# Patient Record
Sex: FEMALE | Race: WHITE | NOT HISPANIC OR LATINO | Employment: OTHER | URBAN - METROPOLITAN AREA
[De-identification: names, ages, dates, MRNs, and addresses within clinical notes are randomized per-mention and may not be internally consistent; named-entity substitution may affect disease eponyms.]

---

## 2018-02-27 ENCOUNTER — HOSPITAL ENCOUNTER (OUTPATIENT)
Dept: NON INVASIVE DIAGNOSTICS | Facility: HOSPITAL | Age: 58
Discharge: HOME | End: 2018-02-27
Payer: COMMERCIAL

## 2018-02-27 DIAGNOSIS — M79.609 PAIN IN LIMB: ICD-10-CM

## 2018-02-27 DIAGNOSIS — M25.50 PAIN IN JOINT, MULTIPLE SITES: ICD-10-CM

## 2018-02-27 PROCEDURE — 93971 EXTREMITY STUDY: CPT | Performed by: SURGERY

## 2018-02-27 PROCEDURE — 93971 EXTREMITY STUDY: CPT

## 2018-07-24 ENCOUNTER — TELEPHONE (OUTPATIENT)
Dept: OBGYN CLINIC | Facility: HOSPITAL | Age: 58
End: 2018-07-24

## 2018-07-24 NOTE — TELEPHONE ENCOUNTER
Patient had records sent over for Dr Tania Angelucci to review  They were sent by mail  Were they received? If so, what is the review status?  Please advise  Call back number: Justin Dia, tequila

## 2018-07-26 NOTE — TELEPHONE ENCOUNTER
Mr Robert Alyssa I left a message on your phone to call me regarding the office notes you are inquiring about  We have not received them  When you call back we can look further into this matter  Have a good day   Carlos Yeboah MA

## 2018-08-07 ENCOUNTER — OFFICE VISIT (OUTPATIENT)
Dept: OBGYN CLINIC | Facility: CLINIC | Age: 58
End: 2018-08-07
Payer: COMMERCIAL

## 2018-08-07 ENCOUNTER — APPOINTMENT (OUTPATIENT)
Dept: RADIOLOGY | Facility: CLINIC | Age: 58
End: 2018-08-07
Payer: COMMERCIAL

## 2018-08-07 VITALS
HEIGHT: 61 IN | SYSTOLIC BLOOD PRESSURE: 111 MMHG | WEIGHT: 111.6 LBS | BODY MASS INDEX: 21.07 KG/M2 | HEART RATE: 71 BPM | DIASTOLIC BLOOD PRESSURE: 72 MMHG

## 2018-08-07 DIAGNOSIS — M25.552 PAIN IN LEFT HIP: ICD-10-CM

## 2018-08-07 DIAGNOSIS — M70.62 TROCHANTERIC BURSITIS OF LEFT HIP: Primary | ICD-10-CM

## 2018-08-07 DIAGNOSIS — Z96.649 STATUS POST REVISION OF TOTAL HIP REPLACEMENT: ICD-10-CM

## 2018-08-07 PROCEDURE — 20610 DRAIN/INJ JOINT/BURSA W/O US: CPT | Performed by: ORTHOPAEDIC SURGERY

## 2018-08-07 PROCEDURE — 99203 OFFICE O/P NEW LOW 30 MIN: CPT | Performed by: ORTHOPAEDIC SURGERY

## 2018-08-07 PROCEDURE — 73503 X-RAY EXAM HIP UNI 4/> VIEWS: CPT

## 2018-08-07 RX ORDER — BUPIVACAINE HYDROCHLORIDE 2.5 MG/ML
1 INJECTION, SOLUTION EPIDURAL; INFILTRATION; INTRACAUDAL
Status: COMPLETED | OUTPATIENT
Start: 2018-08-07 | End: 2018-08-07

## 2018-08-07 RX ORDER — BETAMETHASONE SODIUM PHOSPHATE AND BETAMETHASONE ACETATE 3; 3 MG/ML; MG/ML
6 INJECTION, SUSPENSION INTRA-ARTICULAR; INTRALESIONAL; INTRAMUSCULAR; SOFT TISSUE
Status: COMPLETED | OUTPATIENT
Start: 2018-08-07 | End: 2018-08-07

## 2018-08-07 RX ADMIN — BETAMETHASONE SODIUM PHOSPHATE AND BETAMETHASONE ACETATE 6 MG: 3; 3 INJECTION, SUSPENSION INTRA-ARTICULAR; INTRALESIONAL; INTRAMUSCULAR; SOFT TISSUE at 09:32

## 2018-08-07 RX ADMIN — BUPIVACAINE HYDROCHLORIDE 1 ML: 2.5 INJECTION, SOLUTION EPIDURAL; INFILTRATION; INTRACAUDAL at 09:32

## 2018-08-07 NOTE — PROGRESS NOTES
Assessment:     1  Trochanteric bursitis of left hip    2  Status post revision of total hip replacement          Plan:     Problem List Items Addressed This Visit        Musculoskeletal and Integument    Trochanteric bursitis of left hip - Primary     59-year-old female with left hip and thigh pain following a revision left total hip arthroplasty  I discussed with her that the majority of her problems seem to be stemming from a very tight ITB band in addition to some underlying muscle weakness  I offered her an injection today  Please refer to the procedure note  She was given a script for physical therapy  I discussed that due to the severity of her soft tissue sensitivity and tightness this will likely take quite a while the settle down  I would be happy to repeat an injection for her in three months  Relevant Orders    XR hip/pelv 4+ vw left if performed    Large joint arthrocentesis    Ambulatory referral to Physical Therapy       Other    Status post revision of total hip replacement     59-year-old female status post revision left total hip arthroplasty for a femoral stem subsidence following failure of a femoral nail  Based on radiographs her hip looks to be in good position there is no gross loosening or failure of the implants  I reviewed the radiographs with the patient  Follow-up for the hip as needed  Relevant Orders    Ambulatory referral to Physical Therapy           Patient ID: Gerardo Mauro is a 59 y o  female  Chief Complaint:  Left hip pain after multiple surgeries    HPI:  59-year-old female with pain in her left hip  In January of 2016 she had a fall sustaining a hip fracture  It appears that she had reduction and internal fixation of the left hip  When she was in rehab she had a fall  She went on 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 with a failed cephalomedullary nail  In August of 2017 she had a total hip replacement   This is uncomplicated by subsidence of the femoral stem and she had a revision left total hip arthroplasty performed in January of 2018  She has had continued pain since her hip replacement surgeries  She points to the lateral aspect of her hip and states that it is around the distal part of the incision where it hurts her the most   She has pain if she is bending down or she sitting for any length of time  She is unable to lift her leg up on the phone without severe pain  She has to use her arms to lift her leg up partly because of pain and partly because of weakness  She does get some numbness and tingling in her left foot at times of foot goes very cold  She has had no history of any infections  She had physical therapy back in March  She uses a cane for the left leg  Initially she was not needing that, but now that her pain is so severe she needs to use the cane  She is wanting to know what else can be done for her hip  Patient's medical intake was reviewed  Allergy:  No Known Allergies    Medications:  all current active meds have been reviewed    Past Medical History:  Past Medical History:   Diagnosis Date    COPD (chronic obstructive pulmonary disease) ([facility] [address] )     Disease of thyroid gland     GERD (gastroesophageal reflux disease)     Glaucoma     Psychiatric disorder     bipolar    Restless leg syndrome        Past Surgical History:  Past Surgical History:   Procedure Laterality Date    APPENDECTOMY      FISTULA REPAIR      HIP SURGERY      HYSTERECTOMY      JOINT REPLACEMENT         Family History:  History reviewed  No pertinent family history  Social History:  History   Alcohol Use No     History   Drug Use No     History   Smoking Status    Former Smoker   Smokeless Tobacco    Never Used           ROS:  Review of Systems   Constitutional: Negative  HENT: Negative  Eyes: Negative  Respiratory: Negative  Gastrointestinal: Negative  Endocrine: Negative  Genitourinary: Negative      Musculoskeletal: Positive for arthralgias and joint swelling  Skin: Negative  Allergic/Immunologic: Negative  Neurological: Negative  Hematological: Negative  Psychiatric/Behavioral: Negative  Objective:  BP Readings from Last 1 Encounters:   08/07/18 111/72      Wt Readings from Last 1 Encounters:   08/07/18 50 6 kg (111 lb 9 6 oz)        BMI:   Estimated body mass index is 21 09 kg/m² as calculated from the following:    Height as of this encounter: 5' 1" (1 549 m)  Weight as of this encounter: 50 6 kg (111 lb 9 6 oz)  EXAM:   Physical Exam   Constitutional: She is oriented to person, place, and time  She appears well-developed and well-nourished  No distress  HENT:   Head: Normocephalic and atraumatic  Eyes: Right eye exhibits no discharge  Left eye exhibits no discharge  Neck: Normal range of motion  Neck supple  No tracheal deviation present  Cardiovascular: Normal rate and regular rhythm  Pulmonary/Chest: Effort normal and breath sounds normal  No respiratory distress  She exhibits no tenderness  Abdominal: Soft  She exhibits no distension  There is no tenderness  Neurological: She is alert and oriented to person, place, and time  Skin: Skin is warm and dry  She is not diaphoretic  No erythema  Psychiatric: She has a normal mood and affect  Her behavior is normal    Vitals reviewed  Right Hip Exam   Right hip exam is normal      Tenderness   The patient is experiencing no tenderness  Range of Motion   The patient has normal right hip ROM  Muscle Strength   The patient has normal right hip strength  Tests   TACO: negative    Other   Erythema: absent  Sensation: normal  Pulse: present      Left Hip Exam     Comments:  Patient's left hip has a well-healed posterior lateral incision the much smaller incision just anterior to this over the proximal aspect of the greater troch    She is very tender over the greater trochanter area and all the way down along her ITB band to just above her knee  She has no groin pain with internal and external rotation  She is very guarded gait and is unable to swing her left leg all the way through  She leads with her right foot and brings her left foot to her right  Flexion and extension of the toes and the ankles intact  She has a palpable dorsal pedis pulse  Sensation light touch is intact in the superficial and deep peroneal, sural and saphenous nerve distribution  She has no swelling of the thigh  No erythema or fluctuance  She has noted quadriceps weakness  Radiographs:  I have personally reviewed pertinent films in PACS and my interpretation is X-rays show a left total hip arthroplasty  The acetabulum is in good position with no evidence of loosening  The femoral stem has a collar on it and is cemented  There is no evidence of subsidence or failure  No fractures or dislocations       Large joint arthrocentesis  Date/Time: 8/7/2018 9:32 AM  Consent given by: patient  Timeout: Immediately prior to procedure a time out was called to verify the correct patient, procedure, equipment, support staff and site/side marked as required   Supporting Documentation  Indications: pain   Procedure Details  Location: hip - L greater trochanteric bursa  Preparation: Patient was prepped and draped in the usual sterile fashion  Needle size: 22 G  Approach: posterolateral  Medications administered: 6 mg betamethasone acetate-betamethasone sodium phosphate 6 (3-3) mg/mL; 1 mL bupivacaine (PF) 0 25 %    Patient tolerance: patient tolerated the procedure well with no immediate complications  Dressing:  Sterile dressing applied

## 2018-08-07 NOTE — ASSESSMENT & PLAN NOTE
59-year-old female with left hip and thigh pain following a revision left total hip arthroplasty  I discussed with her that the majority of her problems seem to be stemming from a very tight ITB band in addition to some underlying muscle weakness  I offered her an injection today  Please refer to the procedure note  She was given a script for physical therapy  I discussed that due to the severity of her soft tissue sensitivity and tightness this will likely take quite a while the settle down  I would be happy to repeat an injection for her in three months

## 2018-08-07 NOTE — ASSESSMENT & PLAN NOTE
20-year-old female status post revision left total hip arthroplasty for a femoral stem subsidence following failure of a femoral nail  Based on radiographs her hip looks to be in good position there is no gross loosening or failure of the implants  I reviewed the radiographs with the patient  Follow-up for the hip as needed

## 2018-08-08 ENCOUNTER — EVALUATION (OUTPATIENT)
Dept: PHYSICAL THERAPY | Facility: CLINIC | Age: 58
End: 2018-08-08
Payer: COMMERCIAL

## 2018-08-08 DIAGNOSIS — M70.62 TROCHANTERIC BURSITIS OF LEFT HIP: ICD-10-CM

## 2018-08-08 DIAGNOSIS — Z96.649 STATUS POST REVISION OF TOTAL HIP REPLACEMENT: Primary | ICD-10-CM

## 2018-08-08 PROCEDURE — 97110 THERAPEUTIC EXERCISES: CPT | Performed by: PHYSICAL THERAPIST

## 2018-08-08 PROCEDURE — 97162 PT EVAL MOD COMPLEX 30 MIN: CPT | Performed by: PHYSICAL THERAPIST

## 2018-08-08 PROCEDURE — G8978 MOBILITY CURRENT STATUS: HCPCS | Performed by: PHYSICAL THERAPIST

## 2018-08-08 PROCEDURE — G8979 MOBILITY GOAL STATUS: HCPCS | Performed by: PHYSICAL THERAPIST

## 2018-08-08 NOTE — PROGRESS NOTES
PT Evaluation     Today's date: 2018  Patient name: Brody Irwin  : 1960  MRN: [id number]  Referring provider: Marcy Faustin MD  Dx:   Encounter Diagnosis     ICD-10-CM    1  Status post revision of total hip replacement Z96 649 Ambulatory referral to Physical Therapy   2  Trochanteric bursitis of left hip M70 62 Ambulatory referral to Physical Therapy                  Assessment  Impairments: abnormal gait, abnormal muscle firing, abnormal muscle tone, abnormal or restricted ROM, impaired balance, impaired physical strength and pain with function    Assessment details: Pt is a 59y o  year old female coming to outpatient PT s/p L THR revision and L trochanter bursitis  Pt presents with decreased LLE ROM; decreased LLE strength; increased pain; gait and balance dysfunction; and overall decreased functional mobility  Pt would benefit from skilled PT services in order to address these deficits and reach maximum level of function  Thank you kindly for the referral!     Prognosis: good    Goals  ST  Pt will decrease pain levels by 3 points on NPS in 4 weeks  2  Pt will demonstrate improvement in LLE strength by 1/2 MMT grade in 4 weeks  3  Pt will be independent with HEP in 4 weeks  LT  Achieve FOTO score of 47/100 in 6 weeks  2  Pt will be able to ambulate with LRAD in 6 weeks      Plan  Patient would benefit from: skilled PT  Planned modality interventions: cryotherapy  Planned therapy interventions: manual therapy, joint mobilization, neuromuscular re-education, patient education, postural training, therapeutic exercise, therapeutic activities, gait training, functional ROM exercises, balance/weight bearing training, balance and activity modification  Frequency: 2x week  Duration in weeks: 6        Subjective Evaluation    History of Present Illness  Mechanism of injury: Pt reports her L hip started 16 she fell and broke it   She reports that the next day she had pins put in, she was doing really well, they put her in nursing home in [place] and she reports the nurses partied at night - had to get up to go to BR and fell - damaged everything that was in there  Pt reports they took her to ER, gave her meds for pain, took her back to nursing home, stayed there for a month; went to surgeon - said everything was ok but she was still in a lot of pain  Pt had L THR 2017  Pt reports she was doing well for a while, but her leg started turning and couldn't turn the direction she wanted to  She was referred to back specialist - which said nothing was wrong with back  Pt reports back MD blamed surgeon for not gluing the part of his surgery and the hip MD then performed that 2018, even though she felt terribly against it  She reports the then went through PT and then all of sudden she had more pain  The surgeon wanted to send her ot leg specialist but pt reports she was done with him because she didn't trust him  She reports that she was released from him and went to Dr Walter Byers  Pt was pleased with treatment from Dr Walter Byers  She then referred her to PT here  Pt reports at points she will use her W/C at times because she couldn't get around  Trouble with lifting and moving LLE  Pt reports she stumbles over it while walking because she feels it is dragging  Using SPC to ambulate at this time       Script from MD states posterior hip precautions; pt does report original surgoen did clear her of precautions    Regarding depression screening - pt declined to answer last question of PHQ-9 - discussed with patient possible resources we have, pt reports she is seeing someone at [facility] and is getting current treatment for her depression - just has been worsened by this situation  Pain  At best pain ratin  At worst pain rating: 10  Relieving factors: rest    Social Support  Steps to enter house: yes  2  Lives in: [place]  Lives with: spouse      Diagnostic Tests  Abnormal x-ray: waiting on results  Treatments  Previous treatment: physical therapy  Patient Goals  Patient goals for therapy: decreased pain and increased strength  Patient goal: walk without a cane        Objective     Observations     Additional Observation Details  Posture: L iliac crest higher in standing; L toe out noted in stance  Gait assessment: use of SPC in R; decreased stance time of LLE; antalgic gait; L toe out  Palpation: increased tenderness to palpation over L ITB/TFL  Observation: very guarded; apprehensive to movement; incision intact; healed    Active Range of Motion     Right Hip   Flexion: [facility]    Additional Active Range of Motion Details  *not assessed due to increased pain and apprehension from patient    Passive Range of Motion   Left Hip   External rotation (90/90): 10 degrees with pain  Internal rotation (90/90): 10 degrees with pain    Strength/Myotome Testing     Left Hip   Planes of Motion   Flexion: 3-  Extension: 3-  Left hip abductors strength: seated  Adduction: 2+  External rotation: 2  Internal rotation: 2    Right Hip   Normal muscle strength    Left Knee   Flexion: 3  Extension: 3    Right Knee   Flexion: 4-  Extension: 4-    Tests     Left Hip   SLR: Positive  Right Hip   SLR: Positive  Flowsheet Rows      Most Recent Value   PT/OT G-Codes   Current Score  38   Projected Score  47   FOTO information reviewed  N/A   Assessment Type  Evaluation   G code set  Mobility: Walking & Moving Around   Mobility: Walking and Moving Around Current Status ()  CL   Mobility: Walking and Moving Around Goal Status ()  CK          Daily Treatment Diary     DX: s/p L revision for THR (1/2018);  L hip bursitis  EPOC: 9/19/18  Precautions: posterior hip precautions*; visual deficits  CO-MORBIDITES: h/o stroke  PERSONAL FACTORS: depression    Manual  8/8            L hip PROM (precautions)             L ITB stretch - post hip prec (?)             L DTM - incision - desentization Exercise Diary              Gastroc Stretch             HR/TR             Glute sets             [employer]             LAQ             Standing Marches             HS curl             Standing Hip Abduction             Step Up/lateral             Mini squats             Iso Hip ABD seated HEP            Iso Hip ADD seated HEP            LAQ seated HEP            HS set seated HEP                                                                                 Modalities              CP

## 2018-08-15 ENCOUNTER — OFFICE VISIT (OUTPATIENT)
Dept: PHYSICAL THERAPY | Facility: CLINIC | Age: 58
End: 2018-08-15
Payer: COMMERCIAL

## 2018-08-15 DIAGNOSIS — M70.62 TROCHANTERIC BURSITIS OF LEFT HIP: ICD-10-CM

## 2018-08-15 DIAGNOSIS — Z96.649 STATUS POST REVISION OF TOTAL HIP REPLACEMENT: Primary | ICD-10-CM

## 2018-08-15 PROCEDURE — 97140 MANUAL THERAPY 1/> REGIONS: CPT

## 2018-08-15 PROCEDURE — 97112 NEUROMUSCULAR REEDUCATION: CPT

## 2018-08-15 PROCEDURE — 97110 THERAPEUTIC EXERCISES: CPT

## 2018-08-15 NOTE — PROGRESS NOTES
Daily Note     Today's date: 8/15/2018  Patient name: Dheeraj Garcia  : 1960  MRN: [id number]  Referring provider: Fahad Knight MD  Dx:   Encounter Diagnosis     ICD-10-CM    1  Status post revision of total hip replacement Z96 649    2  Trochanteric bursitis of left hip M70 62                   Subjective: Pt  Stated no change since last seen, a minor increase in discomfort in L glute today  Objective: See treatment diary below      Assessment: Tolerated treatment well  Patient demonstrated fatigue post treatment, exhibited good technique with therapeutic exercises and would benefit from continued PT  Initiated PT POC today  She tolerated all initial TE's well with only minor discomfort  She presented with a SPC but does not use often-not at all while in the house  Without cane she has significant antalgic gait  Pt  Began to fatigue by end of session  Plan: Continue per plan of care  Progress treatment as tolerated  Daily Treatment Diary     DX: s/p L revision for THR (2018);  L hip bursitis  EPOC: 18  Precautions: posterior hip precautions*; visual deficits  CO-MORBIDITES: h/o stroke  PERSONAL FACTORS: depression    Manual  8/8 8/15           L hip PROM (precautions)  10           L ITB stretch - post hip prec (?)             L DTM - incision - desentization                                           Exercise Diary   8/15           Gastroc Stretch  15"x3           HR/TR  20           Glute sets             Delfina Hackett             LAQ  5"x10           Standing Marches  15           HS curl  15           Standing Hip Abduction  15           Step Up/lateral  4"  x10 ea           Mini squats             Iso Hip ABD seated HEP 5"x10           Iso Hip ADD seated HEP 5"x10           LAQ seated HEP 5"x10           HS set seated HEP            Supine Hip abd out 1 in 4  otb  10                                                                   Modalities              CP

## 2018-08-17 ENCOUNTER — OFFICE VISIT (OUTPATIENT)
Dept: PHYSICAL THERAPY | Facility: CLINIC | Age: 58
End: 2018-08-17
Payer: COMMERCIAL

## 2018-08-17 DIAGNOSIS — Z96.649 STATUS POST REVISION OF TOTAL HIP REPLACEMENT: Primary | ICD-10-CM

## 2018-08-17 DIAGNOSIS — M70.62 TROCHANTERIC BURSITIS OF LEFT HIP: ICD-10-CM

## 2018-08-17 PROCEDURE — 97110 THERAPEUTIC EXERCISES: CPT

## 2018-08-17 PROCEDURE — 97140 MANUAL THERAPY 1/> REGIONS: CPT

## 2018-08-17 PROCEDURE — 97112 NEUROMUSCULAR REEDUCATION: CPT

## 2018-08-17 NOTE — PROGRESS NOTES
Daily Note     Today's date: 2018  Patient name: Mj Sarmiento  : 1960  MRN: [id number]  Referring provider: Milton Ram MD  Dx:   Encounter Diagnosis     ICD-10-CM    1  Status post revision of total hip replacement Z96 649    2  Trochanteric bursitis of left hip M70 62                   Subjective: Pt  Stated she felt good after last visit, she just continues to fatigue quickly      Objective: See treatment diary below      Assessment: Tolerated treatment well  Patient demonstrated fatigue post treatment, exhibited good technique with therapeutic exercises and would benefit from continued PT  Pt  Continues to tolerate progressions well  She is very fatigued by end of session but motivated to continue to progress  Will continue to strengthen  Educated pt  On how to desensitize scar at home    Plan: Continue per plan of care  Progress treatment as tolerated  Daily Treatment Diary     DX: s/p L revision for THR (2018);  L hip bursitis  EPOC: 18  Precautions: posterior hip precautions*; visual deficits  CO-MORBIDITES: h/o stroke  PERSONAL FACTORS: depression    Manual  8/8 8/15 8/17          L hip PROM (precautions)  10 10          L ITB stretch - post hip prec (?)             L DTM - incision - desentization                                           Exercise Diary   8/15 8/17          Recumbent Bike   5'          Gastroc Stretch  15"x3 15"x3 ea          HR/TR  20 20          Glute sets             [employer]   5"x10          SAQ             LAQ  5"x10 1#  5"x10          Standing Marches  15 1#  10x2          HS curl  15 1#  10x2          Standing Hip Abduction  15 1#  10x2          Step Up/lateral  downs  4"  x10 ea 6"x10          Mini squats   10          Iso Hip ABD seated HEP 5"x10 5"x10          Iso Hip ADD seated HEP 5"x10 5"x10          LAQ seated HEP 5"x10 1#  5"x10          HS set seated HEP            Supine Hip abd out 1 in 4  otb  10 otb  10          SLR   AA  x10 Modalities  8/17            CP 5

## 2018-08-20 ENCOUNTER — OFFICE VISIT (OUTPATIENT)
Dept: PHYSICAL THERAPY | Facility: CLINIC | Age: 58
End: 2018-08-20
Payer: COMMERCIAL

## 2018-08-20 DIAGNOSIS — M70.62 TROCHANTERIC BURSITIS OF LEFT HIP: ICD-10-CM

## 2018-08-20 DIAGNOSIS — Z96.649 STATUS POST REVISION OF TOTAL HIP REPLACEMENT: Primary | ICD-10-CM

## 2018-08-20 PROCEDURE — 97112 NEUROMUSCULAR REEDUCATION: CPT

## 2018-08-20 PROCEDURE — 97140 MANUAL THERAPY 1/> REGIONS: CPT

## 2018-08-20 PROCEDURE — 97110 THERAPEUTIC EXERCISES: CPT

## 2018-08-20 NOTE — PROGRESS NOTES
Daily Note     Today's date: 2018  Patient name: Gerardo Mauro  : 1960  MRN: [id number]  Referring provider: Ely Delgado MD  Dx:   Encounter Diagnosis     ICD-10-CM    1  Status post revision of total hip replacement Z96 649    2  Trochanteric bursitis of left hip M70 62                   Subjective: Pt  Stated that the outside of her left leg was sore following LV  Notes that she is currently experiencing no pain  Objective: See treatment diary below      Assessment: Tolerated treatment well  Patient demonstrated fatigue post treatment, exhibited good technique with therapeutic exercises and would benefit from continued PT  Pt  Continues to tolerate progressions well  She was bale to complete a SLR without assistance today  She did have difficulty with steps today using her arms to pull herself up  With cuing pt was able to complete step ups with on handrail on the left  Continued with all other TE as charted  Plan: Continue per plan of care  Progress treatment as tolerated  Daily Treatment Diary     DX: s/p L revision for THR (2018);  L hip bursitis  EPOC: 18  Precautions: posterior hip precautions*; visual deficits  CO-MORBIDITES: h/o stroke  PERSONAL FACTORS: depression    Manual  8/8 8/15 8/17 8/20         L hip PROM (precautions)  10 10 10         L ITB stretch - post hip prec (?)             L DTM - incision - desentization                                           Exercise Diary   8/15 8/17 8/20         Recumbent Bike   5' 5'         Gastroc Stretch  15"x3 15"x3 ea 15"x3 ea         HR/TR  20 20 20         Glute sets             Quad sets   5"x10 5"x15         SAQ             LAQ  5"x10 1#  5"x10 1# 5"x 15         Standing Marches  15 1#  10x2 1# 10x2         HS curl  15 1#  10x2 1# 10x2         Standing Hip Abduction  15 1#  10x2 1# 10x2         Step Up/lateral  downs  4"  x10 ea 6"x10 6" x10         Mini squats   10 15         Iso Hip ABD seated HEP 5"x10 5"x10 5"x10 Iso Hip ADD seated HEP 5"x10 5"x10 5"x10         LAQ seated HEP 5"x10 1#  5"x10 1# 5"x 20         HS set seated HEP            Supine Hip abd out 1 in 4  otb  10 otb  10 otb 10         SLR   AA  x10 x10                                                    Modalities  8/17 8/20           CP 5 6

## 2018-08-22 ENCOUNTER — OFFICE VISIT (OUTPATIENT)
Dept: PHYSICAL THERAPY | Facility: CLINIC | Age: 58
End: 2018-08-22
Payer: COMMERCIAL

## 2018-08-22 DIAGNOSIS — M70.62 TROCHANTERIC BURSITIS OF LEFT HIP: ICD-10-CM

## 2018-08-22 DIAGNOSIS — Z96.649 STATUS POST REVISION OF TOTAL HIP REPLACEMENT: Primary | ICD-10-CM

## 2018-08-22 PROCEDURE — 97110 THERAPEUTIC EXERCISES: CPT | Performed by: PHYSICAL THERAPIST

## 2018-08-22 PROCEDURE — 97140 MANUAL THERAPY 1/> REGIONS: CPT | Performed by: PHYSICAL THERAPIST

## 2018-08-22 PROCEDURE — 97112 NEUROMUSCULAR REEDUCATION: CPT | Performed by: PHYSICAL THERAPIST

## 2018-08-22 NOTE — PROGRESS NOTES
Daily Note     Today's date: 2018  Patient name: Rehana Baez  : 1960  MRN: [id number]  Referring provider: Sneha Tena MD  Dx:   Encounter Diagnosis     ICD-10-CM    1  Status post revision of total hip replacement Z96 649    2  Trochanteric bursitis of left hip M70 62                   Subjective: Pt reported feeling sore after previous session, but soreness lasted less than 24 hours  Objective: See treatment diary below      Assessment: Tolerated treatment well  Pt was able to continue progressing standing exercises by increasing repetitions and weights  Pt reported feeling a slight increase in tightness with lateral step ups, but otherwise did not report increase pain with any exercises  Pt reported still having increased soreness and sensitivity around incisions, so focused on scar massage and desensitization  Pt has 90 degrees of L hip flexion PROM, but has increased tightness with ER and abduction  Pt reported feeling muscle soreness after session  Plan: Continue per plan of care  Progress treatment as tolerated  **Co-treated with BC,SPT on 18        Daily Treatment Diary     DX: s/p L revision for THR (2018);  L hip bursitis  EPOC: 18  Precautions: posterior hip precautions*; visual deficits  CO-MORBIDITES: h/o stroke  PERSONAL FACTORS: depression    Manual  8/8 8/15 8/17 8/20 8/22        L hip PROM (precautions)  10 10 10 5        L ITB stretch - post hip prec (?)             L DTM - incision - desentization     5                                      Exercise Diary   8/15 8/17 8/20 8/22        Recumbent Bike   5' 5' 5'        Gastroc Stretch  15"x3 15"x3 ea 15"x3 ea 10"x4         HR/TR  20 20 20 20        Glute sets             [employer]   5"x10 5"x15 np        SAQ             LAQ  5"x10 1#  5"x10 1# 5"x 15 1 5# 3"x10 ea        Standing Marches  15 1#  10x2 1# 10x2 1# 15 ea        HS curl  15 1#  10x2 1# 10x2 1# 15 ea        Standing Hip Abduction  15 1#  10x2 1# 10x2 1# 15 ea        Step Up/lateral  downs  4"  x10 ea 6"x10 6" x10 1# 6" 10x forward, 5x lat        Mini squats   10 15 np        Iso Hip ABD seated HEP 5"x10 5"x10 5"x10 5"x10        Iso Hip ADD seated HEP 5"x10 5"x10 5"x10 5"x10        LAQ seated HEP 5"x10 1#  5"x10 1# 5"x 20         HS set seated HEP            Supine Hip abd out 1 in 4  otb  10 otb  10 otb 10         SLR   AA  x10 x10 10x         Side stepping     2 laps ptb                                      Modalities  8/17 8/20           CP 5 6

## 2018-08-27 ENCOUNTER — OFFICE VISIT (OUTPATIENT)
Dept: PHYSICAL THERAPY | Facility: CLINIC | Age: 58
End: 2018-08-27
Payer: COMMERCIAL

## 2018-08-27 DIAGNOSIS — Z96.649 STATUS POST REVISION OF TOTAL HIP REPLACEMENT: Primary | ICD-10-CM

## 2018-08-27 DIAGNOSIS — M70.62 TROCHANTERIC BURSITIS OF LEFT HIP: ICD-10-CM

## 2018-08-27 PROCEDURE — 97112 NEUROMUSCULAR REEDUCATION: CPT | Performed by: PHYSICAL THERAPIST

## 2018-08-27 PROCEDURE — 97110 THERAPEUTIC EXERCISES: CPT | Performed by: PHYSICAL THERAPIST

## 2018-08-27 PROCEDURE — 97140 MANUAL THERAPY 1/> REGIONS: CPT | Performed by: PHYSICAL THERAPIST

## 2018-08-27 NOTE — PROGRESS NOTES
Daily Note     Today's date: 2018  Patient name: Xiao Dolan  : 1960  MRN: [id number]  Referring provider: Melissa Pérez MD  Dx:   Encounter Diagnosis     ICD-10-CM    1  Status post revision of total hip replacement Z96 649    2  Trochanteric bursitis of left hip M70 62                   Subjective: Pt reported feeling fine after previous session, but sore today due to walking for a long time in the park with her  and dog yesterday  Objective: See treatment diary below      Assessment: Tolerated treatment well  Pt able to continue progressing with TE's without an increase in pain  Continue progressing standing exercises next session  Pt had pain with L abduction PROM, but was able to tolerate L ER PROM without increase in pain within functional limits  Pt able to tolerate increased pressure with deep tissue massage around scar  Pt reported feeling good at the end of session, and just a little bit sore  Plan: Continue per plan of care  Progress treatment as tolerated  **Co-treated with BC,SPT on 18        Daily Treatment Diary     DX: s/p L revision for THR (2018);  L hip bursitis  EPOC: 18  Precautions: posterior hip precautions*; visual deficits  CO-MORBIDITES: h/o stroke  PERSONAL FACTORS: depression    Manual  8/8 8/15 8/17 8/20 8/22 8/27       L hip PROM (precautions)  10 10 10 5 5       L ITB stretch - post hip prec (?)             L DTM - incision - desentization     5 5                                     Exercise Diary   8/15 8/17 8/20 8/22 8/27       Recumbent Bike   5' 5' 5' 5'       Gastroc Stretch  15"x3 15"x3 ea 15"x3 ea 10"x4  3"x20       HR/TR  20 20 20 20 10x ea       Glute sets             [employer]   5"x10 5"x15 np        SAQ             LAQ  5"x10 1#  5"x10 1# 5"x 15 1 5# 3"x10 ea 1 5# 5"x15       Standing Marches  15 1#  10x2 1# 10x2 1# 15 ea 1 5# 10x ea       HS curl  15 1#  10x2 1# 10x2 1# 15 ea 1 5# 10x ea       Standing Hip Abduction  15 1#  10x2 1# 10x2 1# 15 ea 1 5# 10x ea       Step Up/lateral  downs  4"  x10 ea 6"x10 6" x10 1# 6" 10x forward, 5x lat 1 5# 6" 15x fwd/10x lat       Mini squats   10 15 np        Iso Hip ABD seated HEP 5"x10 5"x10 5"x10 5"x10 5"x10       Iso Hip ADD seated HEP 5"x10 5"x10 5"x10 5"x10 5"x10       LAQ seated HEP 5"x10 1#  5"x10 1# 5"x 20         HS set seated HEP            Supine Hip abd out 1 in 4  otb  10 otb  10 otb 10  otb 15x       SLR   AA  x10 x10 10x  15x       Side stepping     2 laps ptb 3 laps otb       TB 4 way SLS                              Modalities  8/17 8/20           CP 5 6

## 2018-08-29 ENCOUNTER — OFFICE VISIT (OUTPATIENT)
Dept: PHYSICAL THERAPY | Facility: CLINIC | Age: 58
End: 2018-08-29
Payer: COMMERCIAL

## 2018-08-29 DIAGNOSIS — M70.62 TROCHANTERIC BURSITIS OF LEFT HIP: ICD-10-CM

## 2018-08-29 DIAGNOSIS — Z96.649 STATUS POST REVISION OF TOTAL HIP REPLACEMENT: Primary | ICD-10-CM

## 2018-08-29 PROCEDURE — 97110 THERAPEUTIC EXERCISES: CPT

## 2018-08-29 PROCEDURE — 97112 NEUROMUSCULAR REEDUCATION: CPT

## 2018-08-29 PROCEDURE — 97140 MANUAL THERAPY 1/> REGIONS: CPT

## 2018-08-29 NOTE — PROGRESS NOTES
Daily Note     Today's date: 2018  Patient name: Gerardo Mauro  : 1960  MRN: [id number]  Referring provider: Ely Delgado MD  Dx:   Encounter Diagnosis     ICD-10-CM    1  Status post revision of total hip replacement Z96 649    2  Trochanteric bursitis of left hip M70 62                   Subjective: Pt reported feeling fine after previous session, but sore today due to walking for a long time in the park with her  and dog yesterday  Objective: See treatment diary below      Assessment: Tolerated treatment well  Pt  continues to show improvement  Pt  tolerated progressions to TE's with minimal difficulty and slight discomfort throughout, increasing with a SLS  Will continue to progress as tolerated and monitor pt  response  Plan: Continue per plan of care  Progress treatment as tolerated  Daily Treatment Diary     DX: s/p L revision for THR (2018);  L hip bursitis  EPOC: 18  Precautions: posterior hip precautions*; visual deficits  CO-MORBIDITES: h/o stroke  PERSONAL FACTORS: depression    Manual  8/8 8/15 8/17 8/20 8/22 8/27 8/29      L hip PROM (precautions)  10 10 10 5 5 8      L ITB stretch - post hip prec (?)             L DTM - incision - desentization     5 5                                     Exercise Diary   8/15 8/17 8/20 8/22 8/27 8/29      Recumbent Bike   5' 5' 5' 5' 5'      Gastroc Stretch  15"x3 15"x3 ea 15"x3 ea 10"x4  3"x20 3x20"      HR/TR  20 20 20 20 10x ea 10x2      Glute sets             [employer]   5"x10 5"x15 np        SAQ             LAQ  5"x10 1#  5"x10 1# 5"x 15 1 5# 3"x10 ea 1 5# 5"x15 2#  5"x10      Standing Marches  15 1#  10x2 1# 10x2 1# 15 ea 1 5# 10x ea 2#  10x2      HS curl  15 1#  10x2 1# 10x2 1# 15 ea 1 5# 10x ea 2#  10x2      Standing Hip Abduction  15 1#  10x2 1# 10x2 1# 15 ea 1 5# 10x ea 2#  10x2      Step Up/lateral  downs  4"  x10 ea 6"x10 6" x10 1# 6" 10x forward, 5x lat 1 5# 6" 15x fwd/10x lat 8" x6  Fwd  6"x10  lat      Mini squats   10 15 np        Iso Hip ABD seated HEP 5"x10 5"x10 5"x10 5"x10 5"x10       Iso Hip ADD seated HEP 5"x10 5"x10 5"x10 5"x10 5"x10 5"x10      LAQ seated HEP 5"x10 1#  5"x10 1# 5"x 20   2#  5"x10      HS set seated HEP            Supine Hip abd out 1 in 4  otb  10 otb  10 otb 10  otb 15x otb  10      Clamshells       otb  10      SLR   AA  x10 x10 10x  15x 15      Side stepping     2 laps ptb 3 laps otb 2 laps  pch TB      TB 4 way SLS       pch  TB  10 ea                       Modalities  8/17 8/20 8/29          CP 5 6 7

## 2018-09-04 ENCOUNTER — OFFICE VISIT (OUTPATIENT)
Dept: PHYSICAL THERAPY | Facility: CLINIC | Age: 58
End: 2018-09-04
Payer: COMMERCIAL

## 2018-09-04 DIAGNOSIS — M70.62 TROCHANTERIC BURSITIS OF LEFT HIP: ICD-10-CM

## 2018-09-04 DIAGNOSIS — Z96.649 STATUS POST REVISION OF TOTAL HIP REPLACEMENT: Primary | ICD-10-CM

## 2018-09-04 PROCEDURE — 97112 NEUROMUSCULAR REEDUCATION: CPT | Performed by: PHYSICAL THERAPIST

## 2018-09-04 PROCEDURE — 97140 MANUAL THERAPY 1/> REGIONS: CPT | Performed by: PHYSICAL THERAPIST

## 2018-09-04 PROCEDURE — 97110 THERAPEUTIC EXERCISES: CPT | Performed by: PHYSICAL THERAPIST

## 2018-09-04 NOTE — PROGRESS NOTES
Daily Note     Today's date: 2018  Patient name: Hetal Arauz  : 1960  MRN: [id number]  Referring provider: Le Tena MD  Dx:   Encounter Diagnosis     ICD-10-CM    1  Status post revision of total hip replacement Z96 649    2  Trochanteric bursitis of left hip M70 62                   Subjective: Pt feeling pretty good  Offers no new complaints  Pt reports a little bit of pain at times when she gets out of her car but it does not linger  Objective: See treatment diary below      Assessment: Tolerated treatment well  Continued to tolerate TE progressions well without complaints of pain and improvement noted in LLE strength  Plan to D/C to HEP Thursday as pt feels she is ready to continue with it on her own  Plan: Continue per plan of care  Progress treatment as tolerated  Daily Treatment Diary     DX: s/p L revision for THR (2018);  L hip bursitis  EPOC: 18  Precautions: posterior hip precautions*; visual deficits  CO-MORBIDITES: h/o stroke  PERSONAL FACTORS: depression    Manual  8/8 8/15 8/17 8/20 8/22 8/27 8/29 9/4     L hip PROM (precautions)  10 10 10 5 5 8 8'     L ITB stretch - post hip prec (?)             L DTM - incision - desentization     5 5                                     Exercise Diary   8/15 8/17 8/20 8/22 8/27 8/29 9/4     Recumbent Bike   5' 5' 5' 5' 5' 5'     Gastroc Stretch  15"x3 15"x3 ea 15"x3 ea 10"x4  3"x20 3x20" 3x20"     HR/TR  20 20 20 20 10x ea 10x2 10x2     Glute sets             [employer]   5"x10 5"x15 np        SAQ             LAQ  5"x10 1#  5"x10 1# 5"x 15 1 5# 3"x10 ea 1 5# 5"x15 2#  5"x10 2#  5"x10     Standing Marches  15 1#  10x2 1# 10x2 1# 15 ea 1 5# 10x ea 2#  10x2 2#  10x2     HS curl  15 1#  10x2 1# 10x2 1# 15 ea 1 5# 10x ea 2#  10x2 2#  10x2     Standing Hip Abduction  15 1#  10x2 1# 10x2 1# 15 ea 1 5# 10x ea 2#  10x2 2#  10x2     Step Up/lateral  downs  4"  x10 ea 6"x10 6" x10 1# 6" 10x forward, 5x lat 1 5# 6" 15x fwd/10x lat 8" x6  Fwd  6"x10  lat 8" x10  Fwd  6"x10  lat     Mini squats   10 15 np        Iso Hip ABD seated HEP 5"x10 5"x10 5"x10 5"x10 5"x10       Iso Hip ADD seated HEP 5"x10 5"x10 5"x10 5"x10 5"x10 5"x10 5"x10     LAQ seated HEP 5"x10 1#  5"x10 1# 5"x 20   2#  5"x10 2#  5"x10     HS set seated HEP            Supine Hip abd out 1 in 4  otb  10 otb  10 otb 10  otb 15x otb  10 otb  10     Clamshells       otb  10 otb  10     SLR   AA  x10 x10 10x  15x 15 15     Side stepping     2 laps ptb 3 laps otb 2 laps  pch TB 2 laps  pch TB     TB 4 way SLS       pch  TB  10 ea pch  TB  10 ea                      Modalities  8/17 8/20 8/29 9/4         CP 5 6 7 5'

## 2018-09-06 ENCOUNTER — EVALUATION (OUTPATIENT)
Dept: PHYSICAL THERAPY | Facility: CLINIC | Age: 58
End: 2018-09-06
Payer: COMMERCIAL

## 2018-09-06 DIAGNOSIS — Z96.649 STATUS POST REVISION OF TOTAL HIP REPLACEMENT: Primary | ICD-10-CM

## 2018-09-06 DIAGNOSIS — M70.62 TROCHANTERIC BURSITIS OF LEFT HIP: ICD-10-CM

## 2018-09-06 PROCEDURE — G8979 MOBILITY GOAL STATUS: HCPCS | Performed by: PHYSICAL THERAPIST

## 2018-09-06 PROCEDURE — G8980 MOBILITY D/C STATUS: HCPCS | Performed by: PHYSICAL THERAPIST

## 2018-09-06 PROCEDURE — 97140 MANUAL THERAPY 1/> REGIONS: CPT | Performed by: PHYSICAL THERAPIST

## 2018-09-06 PROCEDURE — 97110 THERAPEUTIC EXERCISES: CPT | Performed by: PHYSICAL THERAPIST

## 2018-09-06 NOTE — PROGRESS NOTES
PT Re-Evaluation  and PT Discharge    Today's date: 2018  Patient name: Radha Miller  : 1960  MRN: [id number]  Referring provider: Dominic Harrison MD  Dx:   Encounter Diagnosis     ICD-10-CM    1  Status post revision of total hip replacement Z96 649    2  Trochanteric bursitis of left hip M70 62                  Assessment    Assessment details: Pt has been attending outpatient PT for 9 total visits  Pt has made steady progress in PT and has met all impairment and functional goals at this time  Pt is independent with HEP  Pt is D/C from PT to HEP continue to progress towards personal goals  Thank you! Prognosis: good    Goals  ST  Pt will decrease pain levels by 3 points on NPS in 4 weeks  - met  2  Pt will demonstrate improvement in LLE strength by 1/2 MMT grade in 4 weeks  - met  3  Pt will be independent with HEP in 4 weeks  - met  LT  Achieve FOTO score of 47/100 in 6 weeks  - met  2  Pt will be able to ambulate with LRAD in 6 weeks - partially met      Plan  Plan details: D/C from PT at this time        Subjective Evaluation    History of Present Illness  Mechanism of injury: Pt reports that since starting PT she feels better and feels good  Pt reports that she has been able to perform her walking outside at least every day and notices improvement in endurance  Pt reports her pain isn't that bad at all anymore  Pain  At best pain ratin  At worst pain ratin  Relieving factors: rest    Social Support  Steps to enter house: yes  2  L[facility] in: [facility]  Lives with: spouse      Diagnostic Tests  Abnormal x-ray: waiting on results    Treatments  Previous treatment: physical therapy  Patient Goals  Patient goals for therapy: decreased pain and increased strength  Patient goal: walk without a cane - partially met        Objective     Observations     Additional Observation Details  Posture: L iliac crest higher in standing; L toe out noted in stance  Gait assessment: no AD; slight antalgic gait; decreased stance time on LLE; with use of SPC - non-antalgic; improved elvin  Palpation: slight tenderness to palpation over L TFL over incision - but much improved; no more burning sensation    Active Range of Motion     Right Hip   Flexion: [facility]    Additional Active Range of Motion Details  *not assessed due to increased pain and apprehension from patient    Passive Range of Motion   Left Hip   Flexion: 92 degrees   Abduction: 20 degrees   External rotation (90/90): 20 degrees   Internal rotation (90/90): 20 degrees     Strength/Myotome Testing     Left Hip   Planes of Motion   Flexion: 4+  Extension: 4  Abduction: 4- (seated)  Adduction: 4-  External rotation: 4-  Internal rotation: 3+    Right Hip   Normal muscle strength    Left Knee   Flexion: 3  Extension: 3    Right Knee   Flexion: 4-  Extension: 4-    Tests     Left Hip   SLR: Negative  Right Hip   SLR: Negative  Daily Treatment Diary     DX: s/p L revision for THR (1/2018);  L hip bursitis  EPOC: 9/19/18  Precautions: posterior hip precautions*; visual deficits  CO-MORBIDITES: h/o stroke  PERSONAL FACTORS: depression    Manual  8/8 8/15 8/17 8/20 8/22 8/27 8/29 9/4 9/6    L hip PROM (precautions)  10 10 10 5 5 8 8'     L ITB stretch - post hip prec (?)             L DTM - incision - desentization     5 5       Progress report         10'                     Exercise Diary   8/15 8/17 8/20 8/22 8/27 8/29 9/4 9/6    Recumbent Bike   5' 5' 5' 5' 5' 5' 6'    Gastroc Stretch  15"x3 15"x3 ea 15"x3 ea 10"x4  3"x20 3x20" 3x20" 3x20"    HR/TR  20 20 20 20 10x ea 10x2 10x2 10x2    Glute sets             Quad sets   5"x10 5"x15 np        SAQ             LAQ  5"x10 1#  5"x10 1# 5"x 15 1 5# 3"x10 ea 1 5# 5"x15 2#  5"x10 2#  5"x10     Standing Marches  15 1#  10x2 1# 10x2 1# 15 ea 1 5# 10x ea 2#  10x2 2#  10x2 2#  10x2    HS curl  15 1#  10x2 1# 10x2 1# 15 ea 1 5# 10x ea 2#  10x2 2#  10x2 2#  10x2    Standing Hip Abduction  15 1#  10x2 1# 10x2 1# 15 ea 1 5# 10x ea 2#  10x2 2#  10x2 2#  10x2    Step Up/lateral  downs  4"  x10 ea 6"x10 6" x10 1# 6" 10x forward, 5x lat 1 5# 6" 15x fwd/10x lat 8" x6  Fwd  6"x10  lat 8" x10  Fwd  6"x10  lat 8" x10  Fwd  6"x10  lat    Mini squats   10 15 np        Iso Hip ABD seated HEP 5"x10 5"x10 5"x10 5"x10 5"x10       Iso Hip ADD seated HEP 5"x10 5"x10 5"x10 5"x10 5"x10 5"x10 5"x10     LAQ seated HEP 5"x10 1#  5"x10 1# 5"x 20   2#  5"x10 2#  5"x10     HS set seated HEP            Supine Hip abd out 1 in 4  otb  10 otb  10 otb 10  otb 15x otb  10 otb  10     Clamshells       otb  10 otb  10     SLR   AA  x10 x10 10x  15x 15 15     Side stepping     2 laps ptb 3 laps otb 2 laps  pch TB 2 laps  pch TB     TB 4 way SLS       pch  TB  10 ea pch  TB  10 ea                      Modalities  8/17 8/20 8/29 9/4         CP 5 6 7 5'

## 2018-10-23 ENCOUNTER — OFFICE VISIT (OUTPATIENT)
Dept: OBGYN CLINIC | Facility: CLINIC | Age: 58
End: 2018-10-23
Payer: COMMERCIAL

## 2018-10-23 VITALS
SYSTOLIC BLOOD PRESSURE: 126 MMHG | BODY MASS INDEX: 21.07 KG/M2 | HEART RATE: 60 BPM | HEIGHT: 61 IN | WEIGHT: 111.6 LBS | DIASTOLIC BLOOD PRESSURE: 62 MMHG

## 2018-10-23 DIAGNOSIS — M70.62 TROCHANTERIC BURSITIS OF LEFT HIP: Primary | ICD-10-CM

## 2018-10-23 DIAGNOSIS — Z96.649 STATUS POST REVISION OF TOTAL HIP REPLACEMENT: ICD-10-CM

## 2018-10-23 PROCEDURE — 99213 OFFICE O/P EST LOW 20 MIN: CPT | Performed by: ORTHOPAEDIC SURGERY

## 2018-10-23 NOTE — ASSESSMENT & PLAN NOTE
15-year-old female with left hip trochanteric bursitis  I counseled her on deep tissue massage her home exercise program   She will follow up in approximately a month for repeat injection as it is too early to give her another one at this time  I went over some further stretching options with the patient

## 2018-10-23 NOTE — PROGRESS NOTES
Assessment:     1  Trochanteric bursitis of left hip    2  Status post revision of total hip replacement          Plan:     Problem List Items Addressed This Visit        Musculoskeletal and Integument    Trochanteric bursitis of left hip - Primary     49-year-old female with left hip trochanteric bursitis  I counseled her on deep tissue massage her home exercise program   She will follow up in approximately a month for repeat injection as it is too early to give her another one at this time  I went over some further stretching options with the patient  Other    Status post revision of total hip replacement            Patient ID: Taiwo Koroma is a 59 y o  female  Chief Complaint:  Left hip pain     HPI:  49-year-old female who resents to the office today for follow up evaluation of lateral sided left hip pian  She is s/p revision left total hip arthroplasty that was performed in January 2018 by [facility]  At her last appointment on 8/7/18 she underwent a steroid injection for left hip trochanteric bursitis which she states provided her with 80% pain relief  She states she has been compliant with formal physical therapy and has transitioned to a HEP  She states her lateral sided left hip pain is worse when getting up from a seated position  She also noted left leg weakness when going up the stairs  She denies any numbness or tingling           Allergy:  No Known Allergies    Medications:  all current active meds have been reviewed    Past Medical History:  Past Medical History:   Diagnosis Date    COPD [facility]chronic obstructive pulmonary disease) ([facility] [address] )     Depression     Disease of thyroid gland     GERD (gastroesophageal reflux disease)     Glaucoma     Psychiatric disorder     bipolar    Restless leg syndrome     [facility]     3 years; affected eye sight       Past Surgical History:  Past Surgical History:   Procedure Laterality Date    APPENDECTOMY      FISTULA REPAIR      HIP SURGERY      HYSTERECTOMY      JOINT REPLACEMENT         Family History:  History reviewed  No pertinent family history  Social History:  History   Alcohol Use No     History   Drug Use No     History   Smoking Status    Former Smoker   Smokeless Tobacco    Never Used           ROS:  Review of Systems    Objective:  BP Readings from Last 1 Encounters:   10/23/18 126/62      Wt Readings from Last 1 Encounters:   10/23/18 50 6 kg (111 lb 9 6 oz)        BMI:   Estimated body mass index is 21 09 kg/m² as calculated from the following:    Height as of this encounter: 5' 1" (1 549 m)  Weight as of this encounter: 50 6 kg (111 lb 9 6 oz)  EXAM:   Physical Exam  Left Hip Exam     Other   Erythema: absent  Scars: present  Sensation: normal  Pulse: present    Comments:  TTP entire IT band  No pain with external or internal rotation               Radiographs:  I have personally reviewed pertinent films in PACS and my interpretation is ni imaging reviewed today

## 2018-11-27 ENCOUNTER — OFFICE VISIT (OUTPATIENT)
Dept: OBGYN CLINIC | Facility: CLINIC | Age: 58
End: 2018-11-27
Payer: COMMERCIAL

## 2018-11-27 VITALS
WEIGHT: 105 LBS | SYSTOLIC BLOOD PRESSURE: 136 MMHG | DIASTOLIC BLOOD PRESSURE: 54 MMHG | HEART RATE: 102 BPM | HEIGHT: 61 IN | BODY MASS INDEX: 19.83 KG/M2

## 2018-11-27 DIAGNOSIS — M70.62 TROCHANTERIC BURSITIS OF LEFT HIP: Primary | ICD-10-CM

## 2018-11-27 PROCEDURE — 20610 DRAIN/INJ JOINT/BURSA W/O US: CPT | Performed by: ORTHOPAEDIC SURGERY

## 2018-11-27 PROCEDURE — 99213 OFFICE O/P EST LOW 20 MIN: CPT | Performed by: ORTHOPAEDIC SURGERY

## 2018-11-27 RX ORDER — LIDOCAINE HYDROCHLORIDE 10 MG/ML
5 INJECTION, SOLUTION INFILTRATION; PERINEURAL
Status: COMPLETED | OUTPATIENT
Start: 2018-11-27 | End: 2018-11-27

## 2018-11-27 RX ORDER — BETAMETHASONE SODIUM PHOSPHATE AND BETAMETHASONE ACETATE 3; 3 MG/ML; MG/ML
6 INJECTION, SUSPENSION INTRA-ARTICULAR; INTRALESIONAL; INTRAMUSCULAR; SOFT TISSUE
Status: COMPLETED | OUTPATIENT
Start: 2018-11-27 | End: 2018-11-27

## 2018-11-27 RX ADMIN — LIDOCAINE HYDROCHLORIDE 5 ML: 10 INJECTION, SOLUTION INFILTRATION; PERINEURAL at 11:22

## 2018-11-27 RX ADMIN — BETAMETHASONE SODIUM PHOSPHATE AND BETAMETHASONE ACETATE 6 MG: 3; 3 INJECTION, SUSPENSION INTRA-ARTICULAR; INTRALESIONAL; INTRAMUSCULAR; SOFT TISSUE at 11:22

## 2018-11-27 NOTE — ASSESSMENT & PLAN NOTE
Left hip trochanteric bursitis  She was given another injection today and we went over stretching and deep tissue massage  I have also recommended going back to physical therapy to help with some of the deep tissue release

## 2018-11-27 NOTE — PATIENT INSTRUCTIONS
Sit on a firm chair  Placed your right ankle on your left knee  Place your right hand on the right knee and push the knee down toward the ground  Keeping your back straight lean forward  Hold this for 40-60 seconds, every 15-20 seconds leaning into the stretch more  Switch legs and do the stretch again  Repeat for each leg  To get the best results, it is important to hold the stretches for 40-60 seconds, do the stretches several times each day, and lean into the stretches hard as possible  Do the stretch standing as well with the right leg in front of the left putting weight on the left leg  Let your hips kick out to the left side, leaning back as you do so

## 2018-11-27 NOTE — PROGRESS NOTES
Assessment:     1  Trochanteric bursitis of left hip          Plan:     Problem List Items Addressed This Visit        Musculoskeletal and Integument    Trochanteric bursitis of left hip - Primary     Left hip trochanteric bursitis  She was given another injection today and we went over stretching and deep tissue massage  I have also recommended going back to physical therapy to help with some of the deep tissue release  Relevant Orders    Ambulatory referral to Physical Therapy           Patient ID: Dajuan Cordero is a 59 y o  female  Chief Complaint:  Follow-up left hip    Subjective:  61-year-old female with a left trochanteric hip bursitis following total hip arthroplasty  She has not been doing stretches regularly  She is unable to tolerate the deep tissue massage  She is here today for another injection which did give her good relief for approximately two months  She feels like she is back to where she started  She is wanting to do whatever she can to get this pain settle down resolved  The pain is located primarily laterally along the greater trochanter and ITB band  Allergy:  No Known Allergies    Medications:  all current active meds have been reviewed    ROS:  Review of Systems   All other systems reviewed and are negative  Objective:  BP Readings from Last 1 Encounters:   11/27/18 136/54      Wt Readings from Last 1 Encounters:   11/27/18 47 6 kg (105 lb)        Exam:   Physical Exam  Left Hip Exam     Tenderness   The patient is experiencing tenderness in the lateral     Comments:  Significant tenderness all along the incision site and the ITB band  Tight scarring noted  Pain with stretching of the ITB band             Large joint arthrocentesis  Date/Time: 11/27/2018 11:22 AM  Consent given by: patient  Timeout: Immediately prior to procedure a time out was called to verify the correct patient, procedure, equipment, support staff and site/side marked as required Supporting Documentation  Indications: pain   Procedure Details  Location: hip - L hip joint  Preparation: Patient was prepped and draped in the usual sterile fashion  Needle size: 22 G  Approach: posterolateral  Medications administered: 6 mg betamethasone acetate-betamethasone sodium phosphate 6 (3-3) mg/mL; 5 mL lidocaine 1 %    Patient tolerance: patient tolerated the procedure well with no immediate complications  Dressing:  Sterile dressing applied

## 2018-11-30 ENCOUNTER — EVALUATION (OUTPATIENT)
Dept: PHYSICAL THERAPY | Facility: CLINIC | Age: 58
End: 2018-11-30
Payer: COMMERCIAL

## 2018-11-30 DIAGNOSIS — M70.62 TROCHANTERIC BURSITIS OF LEFT HIP: ICD-10-CM

## 2018-11-30 PROCEDURE — 97140 MANUAL THERAPY 1/> REGIONS: CPT | Performed by: PHYSICAL THERAPIST

## 2018-11-30 PROCEDURE — G8978 MOBILITY CURRENT STATUS: HCPCS | Performed by: PHYSICAL THERAPIST

## 2018-11-30 PROCEDURE — 97162 PT EVAL MOD COMPLEX 30 MIN: CPT | Performed by: PHYSICAL THERAPIST

## 2018-11-30 PROCEDURE — G8979 MOBILITY GOAL STATUS: HCPCS | Performed by: PHYSICAL THERAPIST

## 2018-11-30 NOTE — PROGRESS NOTES
PT Evaluation     Today's date: 2018  Patient name: Leonides Pablo  : 1960  MRN: [id number]  Referring provider: Katherine Garcia MD  Dx:   Encounter Diagnosis     ICD-10-CM    1  Trochanteric bursitis of left hip M70 62 Ambulatory referral to Physical Therapy                  Assessment  Assessment details: Pt is a 59year old female presenting to outpatient Physical Therapy with primary complaints of L thigh and hip pain  Pt presents with significant strength deficits of L hip into flexion, abduction, extension, and ER, TTP of the L greater trochanter, and overall decreased functional mobility  These physical deficits are preventing the patient from participating in usual activities such as transferring from sitting to standing, walking for prolonged periods of time, and sleeping through the night without pain  Pt would benefit from skilled PT services in order to address these deficits and reach maximum level of function  Thank you kindly for the referral!  Impairments: activity intolerance, impaired physical strength, lacks appropriate home exercise program and pain with function  Barriers to therapy: None  Understanding of Dx/Px/POC: good   Prognosis: good    Goals  ST  The patient will be fully compliant with HEP within two weeks    2  Pt will report a decrease in pain by at least two points on VAS within three weeks  3 Pt will report 1 or less sleep disturbances due to hip pain within three weeks  LT  The patient will increase FOTO score to 50 within eight weeks  2  The patient will report full return to performing a sit to stand transfer without difficulty indicating return to functional baseline within eight weeks  3 Pt will demonstrate at least 4+/5 L hip strength into extension, abduction and flexion within eight weeks       Plan  Patient would benefit from: skilled physical therapy  Planned modality interventions: thermotherapy: hydrocollator packs and cryotherapy  Planned therapy interventions: therapeutic activities, therapeutic exercise, home exercise program, manual therapy, joint mobilization, balance, patient education and neuromuscular re-education  Frequency: 2x week  Duration in weeks: 8  Plan of Care beginning date: 2018  Plan of Care expiration date: 2019  Treatment plan discussed with: patient        Subjective Evaluation    History of Present Illness  Date of onset: 2018  Mechanism of injury: Pt reports that she has a lot of pain in her L thigh, and that her hip is no longer sore  She has trouble starting to walk when she first gets up  Once she gets walking she feels fine, and she can usually walk without her cane  She was told that she has bursitis and that her tendons "have shrunk" and was given cortisone injections, which was helpful  She notes that her thigh pain has been occurring for almost a year now, since her first hip surgery last January  Pain  Current pain ratin  At best pain ratin  At worst pain rating: 10  Location: L thigh  Quality: radiating  Alleviating factors: "Nothing"  Aggravating factors: standing (Going from sitting to stand, first few steps when walking, moving the leg when in bed,)    Social Support    Employment status: not working (Disability)    Diagnostic Tests  X-ray: normal  Treatments  Previous treatment: physical therapy  Patient Goals  Patient goals for therapy: decreased pain and improved balance  Patient goal: to sleep through the night without pain, improve flexibility, to be able to stand without pain, to walk without holding onto furniture         Objective     Tenderness     Left Hip   Tenderness in the greater trochanter       Neurological Testing     Sensation     Lumbar   Left   Intact: light touch    Right   Intact: light touch    Reflexes   Left   Patellar (L4): normal (2+)  Achilles (S1): normal (2+)    Right   Patellar (L4): normal (2+)  Achilles (S1): normal (2+)    Active Range of Motion     Additional Active Range of Motion Details  Pt demonstrates length deficits in L piriformis, hip flexor, and quad musculature       Strength/Myotome Testing     Left Hip   Planes of Motion   Flexion: 4-  Extension: 3 (pain)  Abduction: 3+  External rotation: 2+  Internal rotation: 3 (Pain with resisted strength testing)    Right Hip   Planes of Motion   Flexion: 5  Extension: 4+  Abduction: 4+  External rotation: 4+    Left Knee   Flexion: 5  Extension: 5    Right Knee   Flexion: 5  Extension: 5    Left Ankle/Foot   Dorsiflexion: 3+  Plantar flexion: 5  Inversion: 4+    Right Ankle/Foot   Dorsiflexion: 3+  Plantar flexion: 5  Inversion: 4+  Eversion: 3+          Precautions: COPD, depression, Bipolar, past L hip surgeries     Daily Treatment Diary     Manual  11/30            STM L greater trochanter 8'                                                                    Exercise Diary              Treadmill             Hip flexor Stretch at stool             Hamstring stretch at stool             HR/TR             Step Ups/Downs             Lateral Step Ups             Standing Hip 4 way             Big Backwards steps             Side Stepping             SLS on pad                          Sunil Stretch             Prone Press-ups             Butterfly Stretch              SLR             Clamshells                          Seated Hip ER with strap                                           Modalities              US L greater trochanter             CP L greater Trochanter (post tx)                          HEP: ITB stretch, piriformis stretch, hip flexor stretch

## 2018-12-04 ENCOUNTER — OFFICE VISIT (OUTPATIENT)
Dept: PHYSICAL THERAPY | Facility: CLINIC | Age: 58
End: 2018-12-04
Payer: COMMERCIAL

## 2018-12-04 DIAGNOSIS — M70.62 TROCHANTERIC BURSITIS OF LEFT HIP: Primary | ICD-10-CM

## 2018-12-04 PROCEDURE — 97112 NEUROMUSCULAR REEDUCATION: CPT

## 2018-12-04 PROCEDURE — 97140 MANUAL THERAPY 1/> REGIONS: CPT

## 2018-12-04 PROCEDURE — 97110 THERAPEUTIC EXERCISES: CPT

## 2018-12-04 PROCEDURE — 97035 APP MDLTY 1+ULTRASOUND EA 15: CPT

## 2018-12-04 NOTE — PROGRESS NOTES
Daily Note     Today's date: 2018  Patient name: Clearnce Sever  : 1960  MRN: [id number]  Referring provider: Manny Huerta MD  Dx:   Encounter Diagnosis     ICD-10-CM    1  Trochanteric bursitis of left hip M70 62                   Subjective: Pt states having constant L hip pain  Objective: See treatment diary below      Assessment: Performed below TE with fair tolerance and technique  Pt was unable to compete a 2nd set of standing 4way SLR due to L hip pain  Noted significant muscle tightness during hip PROM, especially ER/IR  Trial US to L hip, monitor symptoms upon nv         Plan: Continue plan of care    Precautions: COPD, depression, Bipolar, past L hip surgeries     Daily Treatment Diary     Manual             STM L greater trochanter 8'            Hip  PROM    10                                                      Exercise Diary              Treadmill 5'            Hip flexor Stretch at stool 30"x2            Hamstring stretch at stool 30"x2            HR/TR 30x            Step Ups/Downs             Lateral Step Ups             Standing Hip 4 way 10x   ea            Big Backwards steps             Side Stepping OTB  4laps            SLS on pad nv                         Sunil Stretch             Prone Press-ups             Butterfly Stretch              SLR             Clamshells                          Seated Hip ER with strap                                           Modalities              US L greater trochanter 8"+  2set up            CP L greater Trochanter (post tx) 5                         HEP: ITB stretch, piriformis stretch, hip flexor stretch

## 2018-12-06 ENCOUNTER — OFFICE VISIT (OUTPATIENT)
Dept: PHYSICAL THERAPY | Facility: CLINIC | Age: 58
End: 2018-12-06
Payer: COMMERCIAL

## 2018-12-06 DIAGNOSIS — M70.62 TROCHANTERIC BURSITIS OF LEFT HIP: Primary | ICD-10-CM

## 2018-12-06 PROCEDURE — 97035 APP MDLTY 1+ULTRASOUND EA 15: CPT | Performed by: PHYSICAL THERAPIST

## 2018-12-06 PROCEDURE — 97110 THERAPEUTIC EXERCISES: CPT | Performed by: PHYSICAL THERAPIST

## 2018-12-06 NOTE — PROGRESS NOTES
Daily Note     Today's date: 2018  Patient name: Lisa White  : 1960  MRN: [id number]  Referring provider: Marizol Obregon MD  Dx:   Encounter Diagnosis     ICD-10-CM    1  Trochanteric bursitis of left hip M70 62                   Subjective: Pt continues to report pain in her L hip, pointing along her ITB to site the spot  Objective: See treatment diary below      Assessment: Treatment today was focused on stretching, mobility, and pain management as the pt continues to have high levels of pain  She demonstrates moderately limited L hip extension AROM which is causing decreased R step length and un unsteady gait  She was eductaed to continue to diligently stretch at home  Plan: Continue per plan of care         Precautions: COPD, depression, Bipolar, past L hip surgeries     Daily Treatment Diary     Manual            STM L greater trochanter 8'            Hip  PROM    10                                                      Exercise Diary             Treadmill 5' 5'           Hip flexor Stretch at stool 30"x2            Hamstring stretch at stool 30"x2 Supine  3 x30"           HR/TR 30x            Step Ups/Downs             Lateral Step Ups             Standing Hip 4 way 10x   ea            Big Backwards steps             Side Stepping OTB  4laps            SLS on pad nv                         Sunil Stretch  3 x30"           Prone Press-ups             Butterfly Stretch   3 x30"           SLR             Clamshells             ITB stretch with strap  3 x30"                        Seated Hip ER with strap                                           Modalities              L greater trochanter 8"+  2set up            CP L greater Trochanter (post tx) 5                         HEP: ITB stretch, piriformis stretch, hip flexor stretch

## 2018-12-11 ENCOUNTER — OFFICE VISIT (OUTPATIENT)
Dept: PHYSICAL THERAPY | Facility: CLINIC | Age: 58
End: 2018-12-11
Payer: COMMERCIAL

## 2018-12-11 DIAGNOSIS — M70.62 TROCHANTERIC BURSITIS OF LEFT HIP: Primary | ICD-10-CM

## 2018-12-11 PROCEDURE — 97035 APP MDLTY 1+ULTRASOUND EA 15: CPT

## 2018-12-11 PROCEDURE — 97110 THERAPEUTIC EXERCISES: CPT

## 2018-12-11 PROCEDURE — 97112 NEUROMUSCULAR REEDUCATION: CPT

## 2018-12-11 NOTE — PROGRESS NOTES
Daily Note     Today's date: 2018  Patient name: Suman Quinn  : 1960  MRN: [id number]  Referring provider: Bhanu Beth MD  Dx:   Encounter Diagnosis     ICD-10-CM    1  Trochanteric bursitis of left hip M70 62                   Subjective: Pt states feeling much better after lv, believes US is helping her pain  Objective: See treatment diary below      Assessment: Performed below TE with good tolerance and technique  Continue to focus on stretches, modalities, and table TE to continue to aid pain management  Plan: Continue per plan of care         Precautions: COPD, depression, Bipolar, past L hip surgeries     Daily Treatment Diary     Manual           STM L greater trochanter 8'            Hip  PROM    10                                                      Exercise Diary            Treadmill 5' 5' 6'          Hip flexor Stretch at stool 30"x2            Hamstring stretch at stool 30"x2 Supine  3 x30" Supine  3 x30"          HR/TR 30x            Step Ups/Downs             Lateral Step Ups             Standing Hip 4 way 10x   ea            Big Backwards steps             Side Stepping OTB  4laps            SLS on pad nv                         Sunil Stretch  3 x30" 3x30"          Prone Press-ups             Butterfly Stretch   3 x30" 3x30"          SLR-4 way   2x10          Clamshells   2x10          ITB stretch with strap  3 x30" 3x30"          Ball squeezes   10"X10          Seated Hip ER with strap                                           Modalities            US L greater trochanter 8"+  2set up  8"+  2set up  20%            CP L greater Trochanter (post tx) 5                         HEP: ITB stretch, piriformis stretch, hip flexor stretch

## 2018-12-13 ENCOUNTER — OFFICE VISIT (OUTPATIENT)
Dept: PHYSICAL THERAPY | Facility: CLINIC | Age: 58
End: 2018-12-13
Payer: COMMERCIAL

## 2018-12-13 DIAGNOSIS — M70.62 TROCHANTERIC BURSITIS OF LEFT HIP: Primary | ICD-10-CM

## 2018-12-13 PROCEDURE — 97112 NEUROMUSCULAR REEDUCATION: CPT

## 2018-12-13 PROCEDURE — 97110 THERAPEUTIC EXERCISES: CPT

## 2018-12-13 PROCEDURE — 97035 APP MDLTY 1+ULTRASOUND EA 15: CPT

## 2018-12-13 NOTE — PROGRESS NOTES
Daily Note     Today's date: 2018  Patient name: Eddie Kellogg  : 1960  MRN: [id number]  Referring provider: Breanna Ram MD  Dx:   Encounter Diagnosis     ICD-10-CM    1  Trochanteric bursitis of left hip M70 62                   Subjective: Pt states L hip is feeling better, walk around [employer] without pain  Objective: See treatment diary below      Assessment: Performed below TE with good tolerance and technique  Pt experienced pain during the initial movement of each TE  Plan: Dispensed HEP upon nv         Precautions: COPD, depression, Bipolar, past L hip surgeries     Daily Treatment Diary     Manual          STM L greater trochanter 8'            Hip  PROM    10                                                      Exercise Diary           Treadmill 5' 5' 6' 6'         Hip flexor Stretch at stool 30"x2            Hamstring stretch at stool 30"x2 Supine  3 x30" Supine  3 x30" hep         HR/TR 30x   30x         Step Ups/Downs             Lateral Step Ups             Standing Hip 4 way 10x   ea            Big Backwards steps             Side Stepping OTB  4laps            SLS on pad nv                         Sunil Stretch  3 x30" 3x30" Hep         Prone Press-ups             Butterfly Stretch   3 x30" 3x30" hep         SLR-4 way   2x10 1 5#  2x10         Clamshells   2x10 2x10  otb         ITB stretch with strap  3 x30" 3x30" hep         Ball squeezes   10"X10 10"X10  +bridge         Seated Hip ER with strap             Supine Piriformis stretch    30"X3  W/  Towel                          Modalities           US L greater trochanter 8"+  2set up  8"+  2set up  20%   8"+  2set up  20%           CP L greater Trochanter (post tx) 5                         HEP: ITB stretch, piriformis stretch, hip flexor stretch

## 2018-12-18 ENCOUNTER — OFFICE VISIT (OUTPATIENT)
Dept: PHYSICAL THERAPY | Facility: CLINIC | Age: 58
End: 2018-12-18
Payer: COMMERCIAL

## 2018-12-18 DIAGNOSIS — M70.62 TROCHANTERIC BURSITIS OF LEFT HIP: Primary | ICD-10-CM

## 2018-12-18 PROCEDURE — 97110 THERAPEUTIC EXERCISES: CPT | Performed by: PHYSICAL THERAPIST

## 2018-12-18 PROCEDURE — 97112 NEUROMUSCULAR REEDUCATION: CPT | Performed by: PHYSICAL THERAPIST

## 2018-12-18 PROCEDURE — G8978 MOBILITY CURRENT STATUS: HCPCS | Performed by: PHYSICAL THERAPIST

## 2018-12-18 PROCEDURE — G8979 MOBILITY GOAL STATUS: HCPCS | Performed by: PHYSICAL THERAPIST

## 2018-12-18 PROCEDURE — 97035 APP MDLTY 1+ULTRASOUND EA 15: CPT | Performed by: PHYSICAL THERAPIST

## 2018-12-18 NOTE — PROGRESS NOTES
Daily Note     Today's date: 2018  Patient name: Scotty Dixon  : 1960  MRN: [id number]  Referring provider: Deya Wood MD  Dx: No diagnosis found  Subjective: Pt reports that she has noticed small improvements such as decreased pain when performing sit to stands and being able to get up in the middle of the night to use the bathroom without pain  She says that her  also has commented that she seems to be moving better since the start of care  She continues to be frustrated by her persisting weakness  Objective: See treatment diary below      Assessment: Pt continues to demonstrate a decreased stride length during ambulation, likely due to decreased L hip extension ROM  She demonstrated Significant glute med weakness during hip 4-way in standing as she was unable to complete the R LE 4-way as the L LE was fatigued from supporting her body weight  Other strengthening was withheld for the rest of the session so as not to over fatigue the pt  She would benefit from skilled therapy to progress these deficits  Plan: Continue per plan of care             Precautions: COPD, depression, Bipolar, past L hip surgeries     Daily Treatment Diary     Manual          STM L greater trochanter 8'            Hip  PROM    10                                                      Exercise Diary          Treadmill 5' 5' 6' 6' 6'        Hip flexor Stretch at stool 30"x2    NV        HR/TR 30x   30x         Step Ups/Downs     8"  x20        Lateral Step Ups     8" x20        Standing Hip 4 way 10x   ea    OTB x10 each        Big Backwards steps     2 laps        Side Stepping OTB  4laps    NV        SLS on pad nv    NV                     Prone Press-ups     10" x10        SLR-4 way   2x10 1 5#  2x10         Clamshells   2x10 2x10  otb         Ball squeezes   10"X10 10"X10  +bridge         Supine Piriformis stretch    30"X3  W/  Towel Seated  3 x30"                         Modalities  12/4 12/6 12/11 12/13 12/18        US L greater trochanter 8"+  2set up  8"+  2set up  20%   8"+  2set up  20%   8"+  2set up  20%        CP L greater Trochanter (post tx) 5                         HEP: ITB stretch, piriformis stretch, hip flexor stretch, butterfly stretch, McMullen Alejandrina

## 2018-12-20 ENCOUNTER — OFFICE VISIT (OUTPATIENT)
Dept: PHYSICAL THERAPY | Facility: CLINIC | Age: 58
End: 2018-12-20
Payer: COMMERCIAL

## 2018-12-20 DIAGNOSIS — M70.62 TROCHANTERIC BURSITIS OF LEFT HIP: Primary | ICD-10-CM

## 2018-12-20 PROCEDURE — 97112 NEUROMUSCULAR REEDUCATION: CPT | Performed by: PHYSICAL THERAPIST

## 2018-12-20 PROCEDURE — 97110 THERAPEUTIC EXERCISES: CPT | Performed by: PHYSICAL THERAPIST

## 2018-12-20 PROCEDURE — 97035 APP MDLTY 1+ULTRASOUND EA 15: CPT | Performed by: PHYSICAL THERAPIST

## 2018-12-20 NOTE — PROGRESS NOTES
Daily Note     Today's date: 2018  Patient name: Arsh Rothman  : 1960  MRN: [id number]  Referring provider: Michaela Burdick MD  Dx:   Encounter Diagnosis     ICD-10-CM    1  Trochanteric bursitis of left hip M70 62                   Subjective: Pt reports that her hip felt good after her last visit  Objective: See treatment diary below      Assessment:Pt was unable to complete all three sets of the standing hip flexor stretch due to reports of moderate pain in her distal quads when standing back up straight, indicating weakness and possible tightness in that musculature  She continues to demonstrate moderate hip flexor tightness during backwards walking  She tolerated today's strengthening TE better than in her last visit, indicating possible increase in strength        Plan: Continue        Precautions: COPD, depression, Bipolar, past L hip surgeries     Daily Treatment Diary     Manual          STM L greater trochanter 8'            Hip  PROM    10                                                      Exercise Diary         Treadmill 5' 5' 6' 6' 6' 6'       Hip flexor Stretch at stool 30"x2    NV P!       HR/TR 30x   30x  30x       Step Ups/Downs     8"  x20 8"  x20       Lateral Step Ups     8" x20 8"  x20       Standing Hip 4 way 10x   ea    OTB x10 each        Big Backwards steps     2 laps 2 laps       Side Stepping OTB  4laps    NV OTB  3 laps       SLS on pad nv    NV 20" x3 each                    Prone Press-ups     10" x10        SLR-4 way   2x10 1 5#  2x10         Clamshells   2x10 2x10  otb         Ball squeezes   10"X10 10"X10  +bridge         Supine Piriformis stretch    30"X3  W/  Towel Seated  3 x30" HEP                        Modalities         US L greater trochanter 8"+  2set up  8"+  2set up  20%   8"+  2set up  20%   8"+  2set up  20% 8"+  2set up  20%       CP L greater Trochanter (post tx) 5                         HEP: ITB stretch, piriformis stretch, hip flexor stretch, butterfly stretch, Mariam [place]

## 2018-12-27 ENCOUNTER — EVALUATION (OUTPATIENT)
Dept: PHYSICAL THERAPY | Facility: CLINIC | Age: 58
End: 2018-12-27
Payer: COMMERCIAL

## 2018-12-27 DIAGNOSIS — M70.62 TROCHANTERIC BURSITIS OF LEFT HIP: Primary | ICD-10-CM

## 2018-12-27 PROCEDURE — 97110 THERAPEUTIC EXERCISES: CPT | Performed by: PHYSICAL THERAPIST

## 2018-12-27 PROCEDURE — 97112 NEUROMUSCULAR REEDUCATION: CPT | Performed by: PHYSICAL THERAPIST

## 2018-12-27 PROCEDURE — 97035 APP MDLTY 1+ULTRASOUND EA 15: CPT | Performed by: PHYSICAL THERAPIST

## 2018-12-27 NOTE — PROGRESS NOTES
Daily Note     Today's date: 2018  Patient name: Carter Burrows  : 1960  MRN: [id number]  Referring provider: Bridget Austin MD  Dx:   Encounter Diagnosis     ICD-10-CM    1  Trochanteric bursitis of left hip M70 62                   Subjective: Pt reports that she feels herself slowly getting stronger and that walking is becoming easier  Objective: See treatment diary below      Assessment: Pt demonstrated increase L hip strength and activity tolerance as she is able to perform step ups/downs and lateral step ups at full stair height without rest breaks, and demonstrating good form and motor control  She required a sitting break following side steps on the treadmill due to continued L glute med strength deficits  She was able to progress backwards steps to lengthen her stride  She would benefit from continued therapy to further progress her strength and functional mobility  Plan: Progress treatment as tolerated  Precautions: COPD, depression, Bipolar, past L hip surgeries     Daily Treatment Diary     Manual          STM L greater trochanter 8'            Hip  PROM    10                                                      Exercise Diary        Treadmill 5' 5' 6' 6' 6' 6' FW: 5'  BW: 5'  SW: 5'      Hip flexor Stretch at stool 30"x2    NV P!  Hold      HR/TR 30x   30x  30x HEP      Step Ups/Downs     8"  x20 8"  x20 8"  x20      Lateral Step Ups     8" x20 8"  x20 8"  x20      Standing Hip 4 way 10x   ea    OTB x10 each  NV      Big Backwards steps     2 laps 2 laps *on treadmill      Side Stepping OTB  4laps    NV OTB  3 laps HEP      SLS on pad nv    NV 20" x3 each NV                   Prone Press-ups     10" x10        SLR-4 way   2x10 1 5#  2x10         Clamshells   2x10 2x10  otb   HEP      Ball squeezes   10"X10 10"X10  +bridge         Supine Piriformis stretch    30"X3  W/  Towel Seated  3 x30" HEP -- Modalities  12/4 12/6 12/11 12/13 12/18 12/20 12/27      US L greater trochanter 8"+  2set up  8"+  2set up  20%   8"+  2set up  20%   8"+  2set up  20% 8"+  2set up  20% 8"+  2set up  20%      CP L greater Trochanter (post tx) 5                         HEP: ITB stretch, piriformis stretch, hip flexor stretch, butterfly stretch, [place] Damian

## 2019-01-03 ENCOUNTER — EVALUATION (OUTPATIENT)
Dept: PHYSICAL THERAPY | Facility: CLINIC | Age: 59
End: 2019-01-03
Payer: COMMERCIAL

## 2019-01-03 DIAGNOSIS — M70.62 TROCHANTERIC BURSITIS OF LEFT HIP: Primary | ICD-10-CM

## 2019-01-03 PROCEDURE — 97112 NEUROMUSCULAR REEDUCATION: CPT | Performed by: PHYSICAL THERAPIST

## 2019-01-03 PROCEDURE — 97140 MANUAL THERAPY 1/> REGIONS: CPT | Performed by: PHYSICAL THERAPIST

## 2019-01-03 PROCEDURE — G8978 MOBILITY CURRENT STATUS: HCPCS | Performed by: PHYSICAL THERAPIST

## 2019-01-03 PROCEDURE — G8979 MOBILITY GOAL STATUS: HCPCS | Performed by: PHYSICAL THERAPIST

## 2019-01-03 PROCEDURE — 97110 THERAPEUTIC EXERCISES: CPT | Performed by: PHYSICAL THERAPIST

## 2019-01-03 NOTE — PROGRESS NOTES
PT Re-Evaluation     Today's date: 1/3/2019  Patient name: Scotty Dixon  : 1960  MRN: [id number]  Referring provider: Deya Wood MD  Dx:   Encounter Diagnosis     ICD-10-CM    1  Trochanteric bursitis of left hip M70 62                   Assessment  Assessment details: Pt is a 59year old female who has been attending outpatient Physical Therapy with primary complaints of L thigh and hip pain  Pt has demonstrated good progress with therapy, with abolished TTP of the L greater trochanter, mild L hip abduction, flexion, and extension strength improvements, and significant B ankle strength improvements  She continues to demonstrate weakness with L hip strength in all planes of motion, especially extension and ER  Additionally, she continues to ambulate with an antalgic gait pattern and use of SPC  She would benefit from another 4 weeks of continued skilled therapy to progress these deficits and reach maximum level of functioning  Thank you for your referral!  Impairments: activity intolerance, impaired physical strength and pain with function  Barriers to therapy: None  Understanding of Dx/Px/POC: good   Prognosis: good    Goals  ST  The patient will be fully compliant with HEP within two weeks-met    2  Pt will report a decrease in pain by at least two points on VAS within three weeks  -partially met   3  Pt will report 1 or less sleep disturbances due to hip pain within three weeks  -met  LT  The patient will increase FOTO score to 50 within eight weeks-met  2  The patient will report full return to performing a sit to stand transfer without difficulty indicating return to functional baseline within eight weeks  -not met  3  Pt will demonstrate at least 4+/5 L hip strength into extension, abduction and flexion within eight weeks  -partially met       Plan  Patient would benefit from: skilled physical therapy  Planned modality interventions: thermotherapy: hydrocollator packs and cryotherapy  Planned therapy interventions: therapeutic activities, therapeutic exercise, home exercise program, manual therapy, joint mobilization, balance, patient education and neuromuscular re-education  Frequency: 2x week  Duration in weeks: 8  Plan of Care beginning date: 2018  Plan of Care expiration date: 2019  Treatment plan discussed with: patient        Subjective Evaluation    History of Present Illness  Date of onset: 2018  Mechanism of injury: Pt reports that she has noticed some improvements in her hip strength since the start of care  She says that she can walk better  She notes that an activity that continues to be difficult is standing up and she would like to continue to work towards improving this  Pain  Current pain ratin  At best pain ratin  At worst pain ratin  Location: L anterior thigh  Quality: radiating  Aggravating factors: standing (Going from sitting to stand, first few steps when walking, moving the leg when in bed,)  Progression: improved    Social Support    Employment status: not working (Disability)    Diagnostic Tests  X-ray: normal  Treatments  Previous treatment: physical therapy  Patient Goals  Patient goals for therapy: decreased pain, improved balance and increased strength  Patient goal: to be able to stand without pain, to walk without holding onto furniture         Objective     Tenderness     Left Hip   No tenderness in the greater trochanter  Neurological Testing     Sensation     Lumbar   Left   Intact: light touch    Right   Intact: light touch    Reflexes   Left   Patellar (L4): normal (2+)  Achilles (S1): normal (2+)    Right   Patellar (L4): normal (2+)  Achilles (S1): normal (2+)    Active Range of Motion     Additional Active Range of Motion Details  Pt demonstrates length deficits in L piriformis, hip flexor, and quad musculature       Strength/Myotome Testing     Left Hip   Planes of Motion   Flexion: 4+  Extension: 3+ (pain)  Abduction: 4  External rotation: 3  Internal rotation: 3+ (Pain with resisted strength testing)    Right Hip   Planes of Motion   Flexion: 5  Extension: 4+  Abduction: 5  External rotation: 4+  Internal rotation: 4+    Left Knee   Flexion: 5  Extension: 5    Right Knee   Flexion: 5  Extension: 5    Left Ankle/Foot   Dorsiflexion: 4+  Plantar flexion: 5  Inversion: 4+  Eversion: 4    Right Ankle/Foot   Dorsiflexion: 4+  Plantar flexion: 5  Inversion: 5  Eversion: 4    Ambulation     Comments   Pt ambulates with an antalgic gait pattern spending less time on L LE during stance phase, with use of SPC  Flowsheet Rows      Most Recent Value   PT/OT G-Codes   Current Score  56   Projected Score  50   Assessment Type  Re-evaluation   G code set  Mobility: Walking & Moving Around   Mobility: Walking and Moving Around Current Status ()  CK   Mobility: Walking and Moving Around Goal Status ()  CK          Precautions: COPD, depression, Bipolar, past L hip surgeries     autions: COPD, depression, Bipolar, past L hip surgeries     Daily Treatment Diary     Manual  11/30 12/4 12/6 12/11 12/13 1/3       STM L ITB 8'     [place] tail  8'       Hip  PROM    10                                                      Exercise Diary  12/4 12/6 12/11 12/13 12/18 12/20 12/27 1/3     Treadmill 5' 5' 6' 6' 6' 6' FW: 5'  BW: 5'  SW: 5' FW  5'  SW 5'  BW 5'     Hip flexor Stretch at stool 30"x2    NV P!  Hold      HR/TR 30x   30x  30x HEP --     Step Ups/Downs     8"  x20 8"  x20 8"  x20      Lateral Step Ups     8" x20 8"  x20 8"  x20      Standing Hip 4 way 10x   ea    OTB x10 each  NV OTB with flexion  x20 each     Big Backwards steps     2 laps 2 laps *on treadmill D/C     Side Stepping OTB  4laps    NV OTB  3 laps HEP --     SLS on pad nv    NV 20" x3 each NV                   Prone Press-ups     10" x10        SLR   2x10 1 5#  2x10         Clamshells   2x10 2x10  otb   HEP --     Ball squeezes   10"X10 10"X10  +bridge         Supine Piriformis stretch    30"X3  W/  Towel Seated  3 x30" HEP -- --                      Modalities  12/4 12/6 12/11 12/13 12/18 12/20 12/27      US L greater trochanter 8"+  2set up  8"+  2set up  20%   8"+  2set up  20%   8"+  2set up  20% 8"+  2set up  20% 8"+  2set up  20%      CP L greater Trochanter (post tx) 5                         HEP: ITB stretch, piriformis stretch, hip flexor stretch, butterfly stretch, Genice Budds

## 2019-01-08 ENCOUNTER — OFFICE VISIT (OUTPATIENT)
Dept: PHYSICAL THERAPY | Facility: CLINIC | Age: 59
End: 2019-01-08
Payer: COMMERCIAL

## 2019-01-08 DIAGNOSIS — M70.62 TROCHANTERIC BURSITIS OF LEFT HIP: Primary | ICD-10-CM

## 2019-01-08 PROCEDURE — 97140 MANUAL THERAPY 1/> REGIONS: CPT

## 2019-01-08 PROCEDURE — 97110 THERAPEUTIC EXERCISES: CPT

## 2019-01-08 NOTE — PROGRESS NOTES
Daily Note     Today's date: 2019  Patient name: Taiwo Koroma  : 1960  MRN: [id number]  Referring provider: Declan Enriquez MD  Dx:   Encounter Diagnosis     ICD-10-CM    1  Trochanteric bursitis of left hip M70 62                   Subjective: 30mins session due to high co-pay  Pt states hip is coming along well  Objective: See treatment diary below      Assessment:  Dispensed and review HEP, no questions or concerns  However, noted LLE weakness that led to pain during SLR TB 4-way  Performed tigertail to ITBand, post decreased discomfort and pain  Plan: Continue plan of care  Precautions: COPD, depression, Bipolar, past L hip surgeries         Daily Treatment Diary     Manual  11/30 12/4 12/6 12/11 12/13 1/3 1/8      STM L ITB 8'     Tiger tail  8' Tiger tail  8'      Hip  PROM    10                                                      Exercise Diary  12/4 12/6 12/11 12/13 12/18 12/20 12/27 1/3 1/8    Treadmill 5' 5' 6' 6' 6' 6' FW: 5'  BW: 5'  SW: 5' FW  5'  SW 5'  BW 5' FW 5'    Hip flexor Stretch at stool 30"x2    NV P!  Hold      HR/TR 30x   30x  30x HEP --     Step Ups/Downs     8"  x20 8"  x20 8"  x20      Lateral Step Ups     8" x20 8"  x20 8"  x20      Standing Hip 4 way 10x   ea    OTB x10 each  NV OTB with flexion  x20 each     Big Backwards steps     2 laps 2 laps *on treadmill D/C     Side Stepping OTB  4laps    NV OTB  3 laps HEP --     SLS on pad nv    NV 20" x3 each NV                   Prone Press-ups     10" x10        SLR   2x10 1 5#  2x10         Clamshells   2x10 2x10  otb   HEP --     Ball squeezes   10"X10 10"X10  +bridge         Supine Piriformis stretch    30"X3  W/  Towel Seated  3 x30" HEP -- --     Review HEP         22'        Modalities        US L greater trochanter 8"+  2set up  8"+  2set up  20%   8"+  2set up  20%   8"+  2set up  20% 8"+  2set up  20% 8"+  2set up  20%      CP L greater Trochanter (post tx) 5 HEP: ITB stretch, piriformis stretch, hip flexor stretch, butterfly stretch, Susan Bansal

## 2019-01-10 ENCOUNTER — OFFICE VISIT (OUTPATIENT)
Dept: PHYSICAL THERAPY | Facility: CLINIC | Age: 59
End: 2019-01-10
Payer: COMMERCIAL

## 2019-01-10 DIAGNOSIS — M70.62 TROCHANTERIC BURSITIS OF LEFT HIP: Primary | ICD-10-CM

## 2019-01-10 PROCEDURE — 97140 MANUAL THERAPY 1/> REGIONS: CPT

## 2019-01-10 PROCEDURE — 97110 THERAPEUTIC EXERCISES: CPT

## 2019-01-10 NOTE — PROGRESS NOTES
Addendum 1/10/19    Goals  ST  The patient will be fully compliant with HEP within two weeks-met    2  Pt will report a decrease in pain by at least two points on VAS within three weeks  -partially met   3  Pt will report 1 or less sleep disturbances due to hip pain within three weeks  -met  LT  The patient will increase FOTO score to 50 within eight weeks-met  2  The patient will report full return to performing a sit to stand transfer without difficulty indicating return to functional baseline within eight weeks  -partially met  3  Pt will demonstrate at least 4+/5 L hip strength into extension, abduction and flexion within eight weeks  -partially met

## 2019-01-10 NOTE — PROGRESS NOTES
Daily Note     Today's date: 1/10/2019  Patient name: Eric Hoover  : 1960  MRN: [id number]  Referring provider: Estella Barrett MD  Dx:   Encounter Diagnosis     ICD-10-CM    1  Trochanteric bursitis of left hip M70 62                   Subjective: 30mins session due to high co-pay  Pt states hip feels good  Objective: See treatment diary below      Assessment:  Review HEP with pt due to having some questions and concerns  Post session pt felt comfortable with HEP  Advised pt to call if any questions arise  Plan: Discharged pt with HEP    Precautions: COPD, depression, Bipolar, past L hip surgeries         Daily Treatment Diary     Manual  11/30 12/4 12/6 12/11 12/13 1/3 1/8 1/10     STM L ITB 8'     Tiger tail  8' Tiger tail  8' Tiger  Tail  8'     Hip  PROM    10                                                      Exercise Diary  12/4 12/6 12/11 12/13 12/18 12/20 12/27 1/3 1/8 1/10   Treadmill 5' 5' 6' 6' 6' 6' FW: 5'  BW: 5'  SW: 5' FW  5'  SW 5'  BW 5' FW 5' FW 5'  BW 5'   Hip flexor Stretch at stool 30"x2    NV P!  Hold      HR/TR 30x   30x  30x HEP --     Step Ups/Downs     8"  x20 8"  x20 8"  x20      Lateral Step Ups     8" x20 8"  x20 8"  x20      Standing Hip 4 way 10x   ea    OTB x10 each  NV OTB with flexion  x20 each  OTB  x20  ea   Big Backwards steps     2 laps 2 laps *on treadmill D/C     Side Stepping OTB  4laps    NV OTB  3 laps HEP --     SLS on pad nv    NV 20" x3 each NV   20"x2  Ea  blue                Prone Press-ups     10" x10        SLR   2x10 1 5#  2x10         Clamshells   2x10 2x10  otb   HEP --  W/ bridge  OTB   Ball squeezes   10"X10 10"X10  +bridge         Supine Piriformis stretch    30"X3  W/  Towel Seated  3 x30" HEP -- --     Review HEP         22' 10'       Modalities        US L greater trochanter 8"+  2set up  8"+  2set up  20%   8"+  2set up  20%   8"+  2set up  20% 8"+  2set up  20% 8"+  2set up  20%      CP L greater Trochanter (post tx) 5                         HEP: ITB stretch, piriformis stretch, hip flexor stretch, butterfly stretch, Shonda Kunz

## 2019-01-15 ENCOUNTER — APPOINTMENT (OUTPATIENT)
Dept: PHYSICAL THERAPY | Facility: CLINIC | Age: 59
End: 2019-01-15
Payer: COMMERCIAL

## 2019-01-17 ENCOUNTER — APPOINTMENT (OUTPATIENT)
Dept: PHYSICAL THERAPY | Facility: CLINIC | Age: 59
End: 2019-01-17
Payer: COMMERCIAL

## 2019-01-22 ENCOUNTER — APPOINTMENT (OUTPATIENT)
Dept: PHYSICAL THERAPY | Facility: CLINIC | Age: 59
End: 2019-01-22
Payer: COMMERCIAL

## 2019-01-24 ENCOUNTER — APPOINTMENT (OUTPATIENT)
Dept: PHYSICAL THERAPY | Facility: CLINIC | Age: 59
End: 2019-01-24
Payer: COMMERCIAL

## 2019-02-28 ENCOUNTER — OFFICE VISIT (OUTPATIENT)
Dept: OBGYN CLINIC | Facility: CLINIC | Age: 59
End: 2019-02-28
Payer: COMMERCIAL

## 2019-02-28 VITALS
DIASTOLIC BLOOD PRESSURE: 65 MMHG | WEIGHT: 111.4 LBS | BODY MASS INDEX: 21.03 KG/M2 | SYSTOLIC BLOOD PRESSURE: 120 MMHG | HEIGHT: 61 IN

## 2019-02-28 DIAGNOSIS — M70.62 TROCHANTERIC BURSITIS OF LEFT HIP: Primary | ICD-10-CM

## 2019-02-28 PROCEDURE — 99213 OFFICE O/P EST LOW 20 MIN: CPT | Performed by: ORTHOPAEDIC SURGERY

## 2019-02-28 NOTE — PROGRESS NOTES
Assessment:     1  Trochanteric bursitis of left hip          Plan:     Problem List Items Addressed This Visit        Musculoskeletal and Integument    Trochanteric bursitis of left hip - Primary     Left hip trochanteric bursitis  She is doing very well and still feels relief from the cortisone injection given last visit  Discussed importance of continuing IT band stretches on a daily basis  Follow-up as needed if symptoms return  Advised patient the soonest she can have another injection is in a another month  All questions were answered to patient's satisfaction  Plan discussed with Dr Jie Barrera  Patient ID: Erika Urias is a 59 y o  female  Chief Complaint:  Follow-up left hip    Subjective:  78-year-old female with a left trochanteric hip bursitis following total hip arthroplasty  Her last cortisone injection was in November 2018  She states she still continues to feel relief from that injection  She completed physical therapy and felt improvement with that as well  Allergy:  No Known Allergies    Medications:  all current active meds have been reviewed    ROS:  Review of Systems   All other systems reviewed and are negative  Objective:  BP Readings from Last 1 Encounters:   02/28/19 120/65      Wt Readings from Last 1 Encounters:   02/28/19 50 5 kg (111 lb 6 4 oz)        Exam:   Physical Exam  Left Hip Exam     Tenderness   The patient is experiencing no tenderness  Comments:  Good passive range of motion of hip  Pain with stretching of the ITB band             Procedures

## 2019-02-28 NOTE — ASSESSMENT & PLAN NOTE
Left hip trochanteric bursitis  She is doing very well and still feels relief from the cortisone injection given last visit  Discussed importance of continuing IT band stretches on a daily basis  Follow-up as needed if symptoms return  Advised patient the soonest she can have another injection is in a another month  All questions were answered to patient's satisfaction  Plan discussed with Dr Sandra Frederick

## 2019-10-14 ENCOUNTER — TRANSCRIBE ORDERS (OUTPATIENT)
Dept: ADMINISTRATIVE | Facility: HOSPITAL | Age: 59
End: 2019-10-14

## 2019-10-14 DIAGNOSIS — Z13.820 OSTEOPOROSIS SCREENING: ICD-10-CM

## 2019-10-14 DIAGNOSIS — Z12.31 VISIT FOR SCREENING MAMMOGRAM: Primary | ICD-10-CM

## 2019-11-21 ENCOUNTER — HOSPITAL ENCOUNTER (OUTPATIENT)
Dept: BONE DENSITY | Facility: IMAGING CENTER | Age: 59
Discharge: HOME | End: 2019-11-21
Payer: COMMERCIAL

## 2019-11-21 VITALS — HEIGHT: 61 IN | WEIGHT: 97 LBS | BODY MASS INDEX: 18.31 KG/M2

## 2019-11-21 DIAGNOSIS — Z13.820 OSTEOPOROSIS SCREENING: ICD-10-CM

## 2019-11-21 DIAGNOSIS — Z12.31 VISIT FOR SCREENING MAMMOGRAM: ICD-10-CM

## 2019-11-21 PROCEDURE — 77067 SCR MAMMO BI INCL CAD: CPT

## 2019-11-21 PROCEDURE — 77080 DXA BONE DENSITY AXIAL: CPT

## 2019-11-21 PROCEDURE — 77063 BREAST TOMOSYNTHESIS BI: CPT

## 2021-02-11 ENCOUNTER — TRANSCRIBE ORDERS (OUTPATIENT)
Dept: ADMINISTRATIVE | Facility: HOSPITAL | Age: 61
End: 2021-02-11

## 2021-02-11 DIAGNOSIS — R00.2 PALPITATIONS: Primary | ICD-10-CM

## 2021-02-15 ENCOUNTER — HOSPITAL ENCOUNTER (OUTPATIENT)
Dept: NON INVASIVE DIAGNOSTICS | Facility: HOSPITAL | Age: 61
Discharge: HOME | End: 2021-02-15
Payer: COMMERCIAL

## 2021-02-15 DIAGNOSIS — R00.2 PALPITATIONS: ICD-10-CM

## 2021-02-15 PROCEDURE — 93225 XTRNL ECG REC<48 HRS REC: CPT

## 2021-02-15 PROCEDURE — 93226 XTRNL ECG REC<48 HR SCAN A/R: CPT

## 2021-02-17 PROCEDURE — 93227 XTRNL ECG REC<48 HR R&I: CPT | Performed by: INTERNAL MEDICINE

## 2021-02-26 ENCOUNTER — TRANSCRIBE ORDERS (OUTPATIENT)
Dept: ADMINISTRATIVE | Facility: HOSPITAL | Age: 61
End: 2021-02-26

## 2021-02-26 DIAGNOSIS — Z12.31 ENCOUNTER FOR SCREENING MAMMOGRAM FOR MALIGNANT NEOPLASM OF BREAST: Primary | ICD-10-CM

## 2021-03-10 DIAGNOSIS — Z23 ENCOUNTER FOR IMMUNIZATION: ICD-10-CM

## 2021-04-02 ENCOUNTER — HOSPITAL ENCOUNTER (OUTPATIENT)
Dept: RADIOLOGY | Facility: HOSPITAL | Age: 61
Discharge: HOME | End: 2021-04-02
Payer: COMMERCIAL

## 2021-04-02 ENCOUNTER — TRANSCRIBE ORDERS (OUTPATIENT)
Dept: ADMINISTRATIVE | Facility: HOSPITAL | Age: 61
End: 2021-04-02

## 2021-04-02 DIAGNOSIS — R10.10 UPPER ABDOMINAL PAIN: ICD-10-CM

## 2021-04-02 DIAGNOSIS — R10.10 UPPER ABDOMINAL PAIN: Primary | ICD-10-CM

## 2021-04-02 PROCEDURE — 72070 X-RAY EXAM THORAC SPINE 2VWS: CPT

## 2021-04-14 ENCOUNTER — TRANSCRIBE ORDERS (OUTPATIENT)
Dept: ADMINISTRATIVE | Facility: HOSPITAL | Age: 61
End: 2021-04-14

## 2021-04-14 DIAGNOSIS — R10.10 UPPER ABDOMINAL PAIN, UNSPECIFIED: Primary | ICD-10-CM

## 2021-04-14 DIAGNOSIS — E83.52 HYPERCALCEMIA: ICD-10-CM

## 2021-04-14 DIAGNOSIS — F17.200 NICOTINE DEPENDENCE, UNSPECIFIED, UNCOMPLICATED: ICD-10-CM

## 2021-04-17 ENCOUNTER — HOSPITAL ENCOUNTER (OUTPATIENT)
Dept: CT IMAGING | Facility: HOSPITAL | Age: 61
Discharge: HOME | End: 2021-04-17
Payer: COMMERCIAL

## 2021-04-17 DIAGNOSIS — R10.10 UPPER ABDOMINAL PAIN, UNSPECIFIED: ICD-10-CM

## 2021-04-17 DIAGNOSIS — F17.200 NICOTINE DEPENDENCE, UNSPECIFIED, UNCOMPLICATED: ICD-10-CM

## 2021-04-17 DIAGNOSIS — E83.52 HYPERCALCEMIA: ICD-10-CM

## 2021-04-17 PROCEDURE — 74177 CT ABD & PELVIS W/CONTRAST: CPT

## 2021-04-17 RX ADMIN — IOHEXOL 90 ML: 350 INJECTION, SOLUTION INTRAVENOUS at 13:40

## 2021-04-20 ENCOUNTER — HOSPITAL ENCOUNTER (OUTPATIENT)
Dept: MAMMOGRAPHY | Facility: IMAGING CENTER | Age: 61
Discharge: HOME | End: 2021-04-20
Payer: COMMERCIAL

## 2021-04-20 VITALS — HEIGHT: 61 IN | WEIGHT: 90 LBS | BODY MASS INDEX: 16.99 KG/M2

## 2021-04-20 DIAGNOSIS — Z12.31 ENCOUNTER FOR SCREENING MAMMOGRAM FOR MALIGNANT NEOPLASM OF BREAST: ICD-10-CM

## 2021-04-20 PROCEDURE — 77067 SCR MAMMO BI INCL CAD: CPT

## 2021-04-20 PROCEDURE — 77063 BREAST TOMOSYNTHESIS BI: CPT

## 2022-04-26 ENCOUNTER — TELEPHONE (OUTPATIENT)
Dept: GASTROENTEROLOGY | Facility: CLINIC | Age: 62
End: 2022-04-26

## 2022-04-26 NOTE — TELEPHONE ENCOUNTER
04/26/22  Screened by: Roel Siegel    Referring Provider   Pre- Screening: There is no height or weight on file to calculate BMI  Has patient been referred for a routine screening Colonoscopy? yes  Is the patient between 39-70 years old? yes      Previous Colonoscopy no   If yes:    Date:     Facility:     Reason:       SCHEDULING STAFF: If the patient is between 45yrs-49yrs, please advise patient to confirm benefits/coverage with their insurance company for a routine screening colonoscopy, some insurance carriers will only cover at [address] or older  If the patient is over 66years old, please schedule an office visit  Does the patient want to see a Gastroenterologist prior to their procedure OR are they having any GI symptoms? no    Has the patient been hospitalized or had abdominal surgery in the past 6 months? no    Does the patient use supplemental oxygen? no    Does the patient take Coumadin, Lovenox, Plavix, Elliquis, Xarelto, or other blood thinning medication? no    Has the patient had a stroke, cardiac event, or stent placed in the past year? no    SCHEDULING STAFF: If patient answers NO to above questions, then schedule procedure  If patient answers YES to above questions, then schedule office appointment  If patient is between 45yrs - 49yrs, please advise patient that we will have to confirm benefits & coverage with their insurance company for a routine screening colonoscopy

## 2022-05-25 ENCOUNTER — HOSPITAL ENCOUNTER (OUTPATIENT)
Dept: MAMMOGRAPHY | Facility: IMAGING CENTER | Age: 62
Discharge: HOME | End: 2022-05-25
Payer: COMMERCIAL

## 2022-05-25 VITALS — BODY MASS INDEX: 16.98 KG/M2 | WEIGHT: 89.95 LBS | HEIGHT: 61 IN

## 2022-05-25 DIAGNOSIS — Z12.31 ENCOUNTER FOR SCREENING MAMMOGRAM FOR MALIGNANT NEOPLASM OF BREAST: ICD-10-CM

## 2022-05-25 PROCEDURE — 77067 SCR MAMMO BI INCL CAD: CPT

## 2022-05-25 PROCEDURE — 77063 BREAST TOMOSYNTHESIS BI: CPT

## 2022-09-12 ENCOUNTER — OFFICE VISIT (OUTPATIENT)
Dept: PSYCHIATRY | Facility: CLINIC | Age: 62
End: 2022-09-12
Payer: COMMERCIAL

## 2022-09-12 DIAGNOSIS — F31.9 BIPOLAR 1 DISORDER, DEPRESSED (HCC): Primary | ICD-10-CM

## 2022-09-12 DIAGNOSIS — F11.20 CONTINUOUS OPIOID DEPENDENCE (HCC): ICD-10-CM

## 2022-09-12 DIAGNOSIS — F41.1 GAD (GENERALIZED ANXIETY DISORDER): ICD-10-CM

## 2022-09-12 PROCEDURE — 99214 OFFICE O/P EST MOD 30 MIN: CPT | Performed by: NURSE PRACTITIONER

## 2022-09-12 RX ORDER — LITHIUM CARBONATE 300 MG
TABLET ORAL
Qty: 360 TABLET | Refills: 1 | Status: SHIPPED | OUTPATIENT
Start: 2022-09-12

## 2022-09-12 RX ORDER — FLUOXETINE HYDROCHLORIDE 40 MG/1
40 CAPSULE ORAL DAILY
Qty: 90 CAPSULE | Refills: 1 | Status: SHIPPED | OUTPATIENT
Start: 2022-09-12

## 2022-09-12 RX ORDER — HYDROXYZINE HYDROCHLORIDE 25 MG/1
25 TABLET, FILM COATED ORAL 3 TIMES DAILY PRN
COMMUNITY
End: 2022-09-12 | Stop reason: SDUPTHER

## 2022-09-12 RX ORDER — TRAZODONE HYDROCHLORIDE 100 MG/1
300 TABLET ORAL
Qty: 270 TABLET | Refills: 1 | Status: SHIPPED | OUTPATIENT
Start: 2022-09-12

## 2022-09-12 RX ORDER — HYDROXYZINE HYDROCHLORIDE 25 MG/1
25 TABLET, FILM COATED ORAL 3 TIMES DAILY PRN
Qty: 270 TABLET | Refills: 1 | Status: SHIPPED | OUTPATIENT
Start: 2022-09-12

## 2022-09-12 RX ORDER — MIRTAZAPINE 45 MG/1
45 TABLET, FILM COATED ORAL
Qty: 90 TABLET | Refills: 1 | Status: SHIPPED | OUTPATIENT
Start: 2022-09-12

## 2022-09-12 NOTE — PSYCH
Psychiatric Medication Management - Jesús 57 y o  female MRN: [id number]       This note was not shared with the patient due to reasonable likelihood of causing patient harm      Reason for Visit: med check    Subjective: Former pt of [employer]  Treated for Bipolar alexis type 1 and BROOKLYNN  Diagnosed with Bipolar alexis over 15 years ago  Anxiety started 2 years ago  Pt reports racing worried thoughts about " the future " Pt talks about 3 hip surgeries and limited mobility  Pain level today is moderate  Pt also worried about losing eye sight ( hx of glaucoma with 2 surgeries and laser tx prior to that)  Other medical issues include emphysema and hx of Marvia Aneta Syndrome  No current GI symptoms  Current anxiety symptoms are " manageable most of the time " Sleep is broken  " I just wake up " Pt denies nightmares  Denies panic attacks  Depression levels " go up and down " Pt enjoys taking care of pet dogs  Energy and motivation " pretty good " Denies SI/HI  Pt cant remember last manic episode      Review Of Systems:     Constitutional Negative   ENT Negative   Cardiovascular Hx of MI few years ago/ no current cardiac symptoms   Respiratory emphysema and COPD   Gastrointestinal Negative   Genitourinary Negative   Musculoskeletal hip pain/RLS   Integumentary Negative   Neurological Negative   Endocrine hypothyiroidism     Past Medical History:   Patient Active Problem List   Diagnosis    Trochanteric bursitis of left hip    Status post revision of total hip replacement    Continuous opioid dependence ([address] )       Allergies: No Known Allergies    Past Surgical History:   Past Surgical History:   Procedure Laterality Date    APPENDECTOMY      BREAST EXCISIONAL BIOPSY Left 1997    cyst removed    FISTULA REPAIR      HIP SURGERY      HYSTERECTOMY  2000    JOINT REPLACEMENT         Past Psychiatric History: [employer] with 2 prior prescribers    Family Psychiatric History: unknown    Social History: lives with , has 2 adult children    Substance Abuse History: alcohol abuse hx, no alcohol x " several years"    Traumatic History: pt denies    The following portions of the patient's history were reviewed and updated as appropriate: allergies, past family history, past medical history, past social history, past surgical history and problem list       Mental status:  Appearance dressed in casual clothing   Mood stable   Affect Appears generally euthymic, stable, mood-congruent   Speech Normal rate, rhythm, and volume   Thought Processes Linear and goal directed   Associations intact associations   Hallucinations Denies any auditory or visual hallucinations   Thought Content No passive or active suicidal or homicidal ideation, intent, or plan  Orientation Oriented to person, place, time, and situation   Recent and Remote Memory Grossly intact   Attention Span and Concentration Concentration intact   Intellect Appears to be of Average Intelligence   Insight Insight intact   Judgement judgment was intact   Muscle Strength Normal gait    Language Within normal limits   Fund of Knowledge Age appropriate   Pain moderate          ?  Assessment/Plan: overall stable mood/ no medication changes:         Diagnosis: Bipolar Rony type 1, BROOKLYNN      Treatment Recommendations:  Pt will continue Trazodone 100 mg hs prn, Remeron 45 mg hs, Li 600 mg bid, Prozac 40 mg am and Hydroxyzine 25 mg hs prn    Risks, Benefits And Possible Side Effects Of Medications:  Risks, benefits, and possible side effects of medications explained to patient and family, they verbalize understanding    Controlled Medication Discussion: JASON

## 2023-01-10 ENCOUNTER — OFFICE VISIT (OUTPATIENT)
Dept: PSYCHIATRY | Facility: CLINIC | Age: 63
End: 2023-01-10

## 2023-01-10 DIAGNOSIS — F41.1 GAD (GENERALIZED ANXIETY DISORDER): ICD-10-CM

## 2023-01-10 DIAGNOSIS — F31.9 BIPOLAR 1 DISORDER, DEPRESSED (HCC): Primary | ICD-10-CM

## 2023-01-10 RX ORDER — FLUOXETINE HYDROCHLORIDE 40 MG/1
40 CAPSULE ORAL DAILY
Qty: 90 CAPSULE | Refills: 1 | Status: SHIPPED | OUTPATIENT
Start: 2023-01-10

## 2023-01-10 RX ORDER — TRAZODONE HYDROCHLORIDE 100 MG/1
300 TABLET ORAL
Qty: 270 TABLET | Refills: 1 | Status: SHIPPED | OUTPATIENT
Start: 2023-01-10

## 2023-01-10 RX ORDER — MIRTAZAPINE 45 MG/1
45 TABLET, FILM COATED ORAL
Qty: 90 TABLET | Refills: 1 | Status: SHIPPED | OUTPATIENT
Start: 2023-01-10

## 2023-01-10 RX ORDER — HYDROXYZINE HYDROCHLORIDE 25 MG/1
25 TABLET, FILM COATED ORAL 3 TIMES DAILY PRN
Qty: 270 TABLET | Refills: 1 | Status: SHIPPED | OUTPATIENT
Start: 2023-01-10

## 2023-01-10 RX ORDER — LITHIUM CARBONATE 300 MG
TABLET ORAL
Qty: 360 TABLET | Refills: 1 | Status: SHIPPED | OUTPATIENT
Start: 2023-01-10

## 2023-01-10 NOTE — PSYCH
Psychiatric Medication Management - Jesús 57 y o  female MRN: [id number]       This note was not shared with the patient due to reasonable likelihood of causing patient harm    Reason for Visit: med check    Subjective: [facility] pt of [facility]  Treated for Bipolar alexis type 1 and BROOKLYNN  Pt on Li, Trazodone and Prozac, Remeron and Hydroxyzine  Pt more depressed due to Holidays  She talks about younger son she has not been in contact with in over 20 years  " I don't want to have contact with him " Sleep is interrupted  " I keep waking up and falling back to sleep " Pt occasionally takes OTC Melatonin 5 mg for sleep  " I have pretty good energy " Pt enjoys walking her dogs, doing puzzles and coloring adult books  Denies SI/HI  Anxiety is intermittent and manageable  Some anxiety about her own health: hx of 3 hip surgeries with limited mobility  Pain level today is moderate  Pt also worried about losing eye sight ( hx of glaucoma with 2 surgeries and laser tx prior to that)  Other medical issues include emphysema and hx of Ubaldo Ran Syndrome  No current GI symptoms  Pt cant remember last manic episode  Pt content with continuing current medications  Ordered lab work: CMP, CBC with diff and Li level      Review Of Systems:     Constitutional Negative   ENT Negative   Cardiovascular Hx of MI few years ago/ no current cardiac symptoms   Respiratory emphysema and COPD   Gastrointestinal Negative   Genitourinary Negative   Musculoskeletal hip pain/RLS   Integumentary Negative   Neurological Negative   Endocrine hypothyiroidism       Allergies: No Known Allergies    Past Psychiatric History: [employer] with 2 prior prescribers    Family Psychiatric History: unknown    Social History: lives with , has 2 adult children    Substance Abuse History: alcohol abuse hx, no alcohol x " several years"    Traumatic History: pt denies    The following portions of the patient's history were reviewed and updated as appropriate: allergies, past family history, past medical history, past social history, past surgical history and problem list       Mental status:  Appearance dressed in casual clothing   Mood Situational depression   Affect blunted   Speech Normal rate, rhythm, and volume   Thought Processes Linear and goal directed   Associations intact associations   Hallucinations Denies any auditory or visual hallucinations   Thought Content No passive or active suicidal or homicidal ideation, intent, or plan  Orientation Oriented to person, place, time, and situation   Recent and Remote Memory Grossly intact   Attention Span and Concentration Concentration intact   Intellect Appears to be of Average Intelligence   Insight Insight intact   Judgement judgment was intact   Muscle Strength Normal gait    Language Within normal limits   Fund of Knowledge Age appropriate   Pain moderate          ? Assessment/Plan: overall stable mood/ pt will continue Trazodone 300 mg hs prn, Remeron 45 mg hs, Li 600 mg bid, Prozac 40 mg am and Hydroxyzine 25 mg hs prn, pt will complete lab work: CMP, CBC with diff and Li level  Follow up in 3 months      Diagnosis: Bipolar Rony type 1, BROOKLYNN    Risks, Benefits And Possible Side Effects Of Medications:  Risks, benefits, and possible side effects of medications explained to patient and family, they verbalize understanding    Controlled Medication Discussion: NA    Visit Time    Visit Start Time: 09:00 am  Visit Stop Time: 09:35 am  Total Visit Duration: 35 minutes

## 2023-06-09 ENCOUNTER — OFFICE VISIT (OUTPATIENT)
Dept: PSYCHIATRY | Facility: CLINIC | Age: 63
End: 2023-06-09
Payer: COMMERCIAL

## 2023-06-09 DIAGNOSIS — F31.9 BIPOLAR 1 DISORDER, DEPRESSED (HCC): ICD-10-CM

## 2023-06-09 DIAGNOSIS — F41.1 GAD (GENERALIZED ANXIETY DISORDER): ICD-10-CM

## 2023-06-09 PROCEDURE — 99213 OFFICE O/P EST LOW 20 MIN: CPT

## 2023-06-09 RX ORDER — MIRTAZAPINE 45 MG/1
45 TABLET, FILM COATED ORAL
Qty: 90 TABLET | Refills: 1 | Status: SHIPPED | OUTPATIENT
Start: 2023-06-09

## 2023-06-09 RX ORDER — HYDROXYZINE HYDROCHLORIDE 25 MG/1
25 TABLET, FILM COATED ORAL 3 TIMES DAILY PRN
Qty: 270 TABLET | Refills: 1 | Status: SHIPPED | OUTPATIENT
Start: 2023-06-09

## 2023-06-09 RX ORDER — TRAZODONE HYDROCHLORIDE 100 MG/1
300 TABLET ORAL
Qty: 270 TABLET | Refills: 1 | Status: SHIPPED | OUTPATIENT
Start: 2023-06-09

## 2023-06-09 RX ORDER — FLUOXETINE HYDROCHLORIDE 40 MG/1
40 CAPSULE ORAL DAILY
Qty: 90 CAPSULE | Refills: 1 | Status: SHIPPED | OUTPATIENT
Start: 2023-06-09

## 2023-06-09 RX ORDER — LITHIUM CARBONATE 300 MG
TABLET ORAL
Qty: 360 TABLET | Refills: 1 | Status: SHIPPED | OUTPATIENT
Start: 2023-06-09

## 2023-06-09 NOTE — PSYCH
"Psychiatric Medication Management - Jesús 57 y o  female MRN: [id number]       This note was not shared with the patient due to reasonable likelihood of causing patient harm    Reason for Visit: medication management    Subjective: Former patient of Cyril Sargent being treated for Bipolar 1 disorder and BROOKLYNN  Patient is observed on Lithium 600mg PO BID, Trazodone 300mg PO HS, Prozac 40mg PO QAM, Remeron 45mg PO HS, and Hydroxyzine 25mg PO TID PRN  Patient reports she is \"doing fine  \" Mood has been stable  Depression has improved and anxiety has been manageable  Sleep has been overall good, getting about 6-8 hours a night with frequent awakenings  Patient took a break from melatonin as it worked well for a time but lost its effectiveness  Patient is planning to start taking it again  Appetite is adequate, however patient reports eating dinner only and snacks in the morning and afternoon  Patient states she had a part of her stomach removed in 1989 and don't get hungry often  Energy and motivation has been good  Patient enjoys coloring, sudoku, and playing with her pets  Patient continues to worry about losing eyesight as she states her glaucoma has been getting out of control  Patient has an appointment in August  Pain level today is minimal  Other medical issues include emphysema and hx of [place]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  Denies current GI symptoms  Patient denies symptoms of toni  Patient denies AH/VH and SI/HI          Review Of Systems:     Constitutional Negative   ENT Negative   Cardiovascular Hx of MI few years ago/ no current cardiac symptoms   Respiratory emphysema and COPD   Gastrointestinal Negative   Genitourinary Negative   Musculoskeletal hip pain/RLS   Integumentary Negative   Neurological Negative   Endocrine hypothyiroidism       Allergies: No Known Allergies    Past Psychiatric History: [facility] with 2 prior prescribers    Family Psychiatric History: " "unknown    Social History: lives with , has 2 adult children    Substance Abuse History: alcohol abuse hx, no alcohol x \" several years\"    Traumatic History: pt denies    The following portions of the patient's history were reviewed and updated as appropriate: allergies, past family history, past medical history, past social history, past surgical history and problem list       Mental status:  Appearance dressed in casual clothing   Mood Euthymic   Affect Mood congruent   Speech Normal rate, rhythm, and volume   Thought Processes Linear and goal directed   Associations intact associations   Hallucinations Denies any auditory or visual hallucinations   Thought Content No passive or active suicidal or homicidal ideation, intent, or plan  Orientation Oriented to person, place, time, and situation   Recent and Remote Memory Grossly intact   Attention Span and Concentration Concentration intact   Intellect Appears to be of Average Intelligence   Insight Insight intact   Judgement judgment was intact   Muscle Strength Normal gait    Language Within normal limits   Fund of Knowledge Age appropriate   Pain moderate           Assessment/Plan:   1  Continue Trazodone 300mg PO HS  2  Continue Remeron 45mg PO HS  3  Continue Prozac 40mg PO QAM  4  Continue Lithium 600mg PO BID  5  Continue Hydroxyzine 25mg PO TID PRN  6  Call if any adverse medication side effects  7   Follow up in 5 months    Diagnosis: Bipolar Rony type 1, BROOKLYNN    Risks, Benefits And Possible Side Effects Of Medications:  Risks, benefits, and possible side effects of medications explained to patient and family, they verbalize understanding    Controlled Medication Discussion: NA    Visit Time    Visit Start Time: 08:30 am  Visit Stop Time: 08:57 am  Total Visit Duration: 27 minutes        "

## 2023-07-20 ENCOUNTER — HOSPITAL ENCOUNTER (EMERGENCY)
Facility: HOSPITAL | Age: 63
Discharge: HOME | End: 2023-07-20
Attending: EMERGENCY MEDICINE
Payer: COMMERCIAL

## 2023-07-20 VITALS
HEIGHT: 61 IN | WEIGHT: 89 LBS | TEMPERATURE: 98.5 F | DIASTOLIC BLOOD PRESSURE: 84 MMHG | SYSTOLIC BLOOD PRESSURE: 132 MMHG | HEART RATE: 90 BPM | OXYGEN SATURATION: 98 % | RESPIRATION RATE: 18 BRPM | BODY MASS INDEX: 16.8 KG/M2

## 2023-07-20 DIAGNOSIS — L03.90 CELLULITIS: ICD-10-CM

## 2023-07-20 DIAGNOSIS — T63.441A BEE STING: Primary | ICD-10-CM

## 2023-07-20 PROCEDURE — 99284 EMERGENCY DEPT VISIT MOD MDM: CPT | Performed by: EMERGENCY MEDICINE

## 2023-07-20 PROCEDURE — 90715 TDAP VACCINE 7 YRS/> IM: CPT | Performed by: EMERGENCY MEDICINE

## 2023-07-20 PROCEDURE — 90471 IMMUNIZATION ADMIN: CPT

## 2023-07-20 RX ORDER — DIPHENHYDRAMINE HCL 25 MG
50 TABLET ORAL ONCE
Status: COMPLETED | OUTPATIENT
Start: 2023-07-20 | End: 2023-07-20

## 2023-07-20 RX ORDER — LACTOBACILLUS ACIDOPHILUS / LACTOBACILLUS BULGARICUS 100 MILLION CFU STRENGTH
1 GRANULES ORAL ONCE
Status: COMPLETED | OUTPATIENT
Start: 2023-07-20 | End: 2023-07-20

## 2023-07-20 RX ORDER — PREDNISONE 20 MG/1
60 TABLET ORAL ONCE
Status: COMPLETED | OUTPATIENT
Start: 2023-07-20 | End: 2023-07-20

## 2023-07-20 RX ORDER — PREDNISONE 10 MG/1
TABLET ORAL
Qty: 20 TABLET | Refills: 0 | Status: SHIPPED | OUTPATIENT
Start: 2023-07-21

## 2023-07-20 RX ORDER — ACETAMINOPHEN 325 MG/1
650 TABLET ORAL ONCE
Status: COMPLETED | OUTPATIENT
Start: 2023-07-20 | End: 2023-07-20

## 2023-07-20 RX ORDER — CEPHALEXIN 250 MG/1
500 CAPSULE ORAL ONCE
Status: COMPLETED | OUTPATIENT
Start: 2023-07-20 | End: 2023-07-20

## 2023-07-20 RX ORDER — CEPHALEXIN 500 MG/1
500 CAPSULE ORAL 4 TIMES DAILY
Qty: 28 CAPSULE | Refills: 0 | Status: SHIPPED | OUTPATIENT
Start: 2023-07-20 | End: 2023-07-27

## 2023-07-20 RX ORDER — DIPHENHYDRAMINE HCL 50 MG
50 CAPSULE ORAL EVERY 6 HOURS PRN
Qty: 30 CAPSULE | Refills: 0 | Status: SHIPPED | OUTPATIENT
Start: 2023-07-20

## 2023-07-20 RX ORDER — L. ACIDOPHILUS/L.BULGARICUS 1MM CELL
1 TABLET ORAL
Qty: 40 TABLET | Refills: 0 | Status: SHIPPED | OUTPATIENT
Start: 2023-07-21 | End: 2023-07-31

## 2023-07-20 RX ADMIN — CEPHALEXIN 500 MG: 250 CAPSULE ORAL at 11:00

## 2023-07-20 RX ADMIN — LACTOBACILLUS ACIDOPHILUS / LACTOBACILLUS BULGARICUS 1 PACKET: 100 MILLION CFU STRENGTH GRANULES at 11:01

## 2023-07-20 RX ADMIN — TETANUS TOXOID, REDUCED DIPHTHERIA TOXOID AND ACELLULAR PERTUSSIS VACCINE, ADSORBED 0.5 ML: 5; 2.5; 8; 8; 2.5 SUSPENSION INTRAMUSCULAR at 10:48

## 2023-07-20 RX ADMIN — DIPHENHYDRAMINE HYDROCHLORIDE 50 MG: 25 TABLET ORAL at 10:47

## 2023-07-20 RX ADMIN — PREDNISONE 60 MG: 20 TABLET ORAL at 10:47

## 2023-07-20 RX ADMIN — ACETAMINOPHEN 325MG 650 MG: 325 TABLET ORAL at 10:47

## 2023-07-20 NOTE — ED PROVIDER NOTES
History  Chief Complaint   Patient presents with   • Bee Sting     Patient presents to the ER with a swollen right hand post being stung by a yellow jacket yesterday morning ~0800. Marbella Situ sting to the right hand yesterday complains of pain burning and itch last tetanus is unknown. Patient has some swelling and erythema to the dorsal right hand with mild limited  pulses and gross sensation intact. No proximal streaking. No fevers or chills no nausea vomiting shortness of breath or urticaria. History provided by:  Patient and spouse  Medical Problem  Location:  Right hand  Quality:  Burning pain swelling erythema  Severity:  Moderate  Onset quality:  Gradual  Duration:  1 day  Timing:  Constant  Progression:  Worsening  Chronicity:  New  Context:  Bee sting/yellowjacket bite to the right hand with redness and swelling  Associated symptoms: rash    Associated symptoms: no fever, no nausea, no shortness of breath and no vomiting        Prior to Admission Medications   Prescriptions Last Dose Informant Patient Reported? Taking?    FLUoxetine (PROzac) 40 MG capsule   No No   Sig: Take 1 capsule (40 mg total) by mouth daily   albuterol (ACCUNEB) 0.63 MG/3ML nebulizer solution  Self Yes No   Sig: Take 1 ampule by nebulization 4 (four) times a day   albuterol (PROVENTIL HFA,VENTOLIN HFA) 90 mcg/act inhaler  Self Yes No   Sig: Inhale 2 puffs every 6 (six) hours as needed for wheezing   budesonide-formoterol (SYMBICORT) 160-4.5 mcg/act inhaler  Self Yes No   Sig: Inhale 2 puffs 2 (two) times a day   colesevelam (WELCHOL) 625 mg tablet  Self Yes No   Sig: Take 625 mg by mouth 2 (two) times a day with meals   hydrOXYzine HCL (ATARAX) 25 mg tablet   No No   Sig: Take 1 tablet (25 mg total) by mouth 3 (three) times a day as needed (for anxiety)   levothyroxine 75 mcg tablet  Self Yes No   Sig: Take 75 mcg by mouth daily   lithium 300 MG tablet   No No   Si capsule twice a day   mirtazapine (REMERON) 45 MG tablet   No No   Sig: Take 1 tablet (45 mg total) by mouth daily at bedtime   rOPINIRole (REQUIP) 5 MG tablet  Self Yes No   Sig: Take 5 mg by mouth daily at bedtime   sucralfate (CARAFATE) 1 g tablet  Self Yes No   Sig: Take 1 g by mouth 4 (four) times a day   traZODone (DESYREL) 100 mg tablet   No No   Sig: Take 3 tablets (300 mg total) by mouth daily at bedtime      Facility-Administered Medications: None       Past Medical History:   Diagnosis Date   • COPD (chronic obstructive pulmonary disease) (HCC)    • Depression    • Disease of thyroid gland    • GERD (gastroesophageal reflux disease)    • Glaucoma    • Psychiatric disorder     bipolar   • Restless leg syndrome    • Stroke ([address])     3 years; affected eye sight       Past Surgical History:   Procedure Laterality Date   • APPENDECTOMY     • BREAST EXCISIONAL BIOPSY Left 1997    cyst removed   • FISTULA REPAIR     • HIP SURGERY     • HYSTERECTOMY  2000   • JOINT REPLACEMENT         Family History   Problem Relation Age of Onset   • No Known Problems Mother    • Prostate cancer Father         guessing 72   • No Known Problems Maternal Grandmother    • No Known Problems Maternal Grandfather    • No Known Problems Paternal Grandmother    • No Known Problems Paternal Grandfather    • No Known Problems Maternal Aunt    • No Known Problems Maternal Aunt    • No Known Problems Paternal Aunt      I have reviewed and agree with the history as documented. E-Cigarette/Vaping   • E-Cigarette Use Never User      E-Cigarette/Vaping Substances     Social History     Tobacco Use   • Smoking status: Former   • Smokeless tobacco: Never   Vaping Use   • Vaping Use: Never used   Substance Use Topics   • Alcohol use: No   • Drug use: No       Review of Systems   Constitutional: Negative for fever. Respiratory: Negative for shortness of breath. Gastrointestinal: Negative for nausea and vomiting. Skin: Positive for rash and wound.    All other systems reviewed and are negative. Physical Exam  Physical Exam  Vitals and nursing note reviewed. Constitutional:       General: She is not in acute distress. Appearance: She is not ill-appearing, toxic-appearing or diaphoretic. HENT:      Head: Normocephalic and atraumatic. Right Ear: External ear normal.      Left Ear: External ear normal.   Eyes:      General:         Right eye: No discharge. Left eye: No discharge. Extraocular Movements: Extraocular movements intact. Pupils: Pupils are equal, round, and reactive to light. Cardiovascular:      Rate and Rhythm: Normal rate and regular rhythm. Pulses: Normal pulses. Heart sounds: No murmur heard. No friction rub. No gallop. Pulmonary:      Effort: Pulmonary effort is normal. No respiratory distress. Breath sounds: No stridor. No wheezing, rhonchi or rales. Abdominal:      General: There is no distension. Palpations: Abdomen is soft. Tenderness: There is no abdominal tenderness. There is no guarding. Musculoskeletal:      Cervical back: Normal range of motion and neck supple. No rigidity or tenderness. Right lower leg: No edema. Left lower leg: No edema. Comments: Slight decrease  range of motion to the right hand secondary to pain and swelling. Pulses and gross sensation intact   Skin:     Coloration: Skin is not jaundiced. Findings: Erythema and rash present. No bruising. Comments: Swelling and mild erythema to the dorsal aspect of the right hand and right wrist area no stingers present. No proximal streaking. Neurological:      General: No focal deficit present. Mental Status: She is alert and oriented to person, place, and time. Cranial Nerves: No cranial nerve deficit. Sensory: No sensory deficit. Coordination: Coordination normal.   Psychiatric:         Mood and Affect: Mood normal.         Thought Content:  Thought content normal.                 Vital Signs  ED Triage Vitals [07/20/23 1012]   Temperature Pulse Respirations Blood Pressure SpO2   98.5 °F (36.9 °C) 92 18 132/84 98 %      Temp Source Heart Rate Source Patient Position - Orthostatic VS BP Location FiO2 (%)   Oral Monitor Sitting Right arm --      Pain Score       10 - Worst Possible Pain           Vitals:    07/20/23 1012 07/20/23 1015   BP: 132/84 132/84   Pulse: 92 90   Patient Position - Orthostatic VS: Sitting Sitting         Visual Acuity      ED Medications  Medications   tetanus-diphtheria-acellular pertussis (BOOSTRIX) IM injection 0.5 mL (0.5 mL Intramuscular Given 7/20/23 1048)   cephalexin (KEFLEX) capsule 500 mg (500 mg Oral Given 7/20/23 1100)   diphenhydrAMINE (BENADRYL) tablet 50 mg (50 mg Oral Given 7/20/23 1047)   predniSONE tablet 60 mg (60 mg Oral Given 7/20/23 1047)   acetaminophen (TYLENOL) tablet 650 mg (650 mg Oral Given 7/20/23 1047)   lactobacillus acidophilus-bulgaricus (FLORANEX) packet 1 packet (1 packet Oral Given 7/20/23 1101)       Diagnostic Studies  Results Reviewed     None                 No orders to display              Procedures  Procedures         ED Course  ED Course as of 07/20/23 1133   Thu Jul 20, 2023   1036 Patient was also instructed to hold her Atarax while taking the diphenhydramine                               SBIRT 20yo+    Flowsheet Row Most Recent Value   Initial Alcohol Screen: US AUDIT-C     1. How often do you have a drink containing alcohol? 0 Filed at: 07/20/2023 1014   Audit-C Score 0 Filed at: 07/20/2023 1014   KEVON: How many times in the past year have you. .. Used an illegal drug or used a prescription medication for non-medical reasons?  Never Filed at: 07/20/2023 1014                    Medical Decision Making  Insisting to the right hand differential includes local reaction versus early cellulitis we will treat for both and have patient recheck in a few days with her primary care physician return to the ER if symptoms worsen    Risk  OTC drugs. Prescription drug management. Disposition  Final diagnoses:   Bee sting   Cellulitis     Time reflects when diagnosis was documented in both MDM as applicable and the Disposition within this note     Time User Action Codes Description Comment    7/20/2023 10:25 AM Edmundo Client Add [H93.941H] Bee sting     7/20/2023 10:26 AM Edmundo Client Add [L03.90] Cellulitis       ED Disposition     ED Disposition   Discharge    Condition   Stable    Date/Time   Thu Jul 20, 2023 10:35 AM    Comment   Shahana Wolff discharge to home/self care. Follow-up Information     Follow up With Specialties Details Why Contact Info Additional Information    Sendy Loving, DO Internal Medicine In 2 days For wound re-check [address] [facility] [phone] [facility] Emergency Medicine  As needed, If symptoms worsen [address] [facility] [phone]  [address] [facility] Emergency [facility], [address] [facility], [address] [facility]          Discharge Medication List as of 7/20/2023 10:35 AM      START taking these medications    Details   cephalexin (KEFLEX) 500 mg capsule Take 1 capsule (500 mg total) by mouth 4 (four) times a day for 7 days, Starting Thu 7/20/2023, Until Thu 7/27/2023, Normal      diphenhydrAMINE (BENADRYL) 50 mg capsule Take 1 capsule (50 mg total) by mouth every 6 (six) hours as needed for itching, Starting Thu 7/20/2023, Normal      predniSONE 10 mg tablet 4 pills x 2 days, 3 pills x 2 days, 2 pills x 2 days, 1 pill x 2 days.  Do not start before July 21, 2023., Normal         CONTINUE these medications which have NOT CHANGED    Details   albuterol (ACCUNEB) 0.63 MG/3ML nebulizer solution Take 1 ampule by nebulization 4 (four) times a day, Historical Med      albuterol (PROVENTIL HFA,VENTOLIN HFA) 90 mcg/act inhaler Inhale 2 puffs every 6 (six) hours as needed for wheezing, Historical Med      budesonide-formoterol (SYMBICORT) 160-4.5 mcg/act inhaler Inhale 2 puffs 2 (two) times a day, Historical Med      colesevelam (WELCHOL) 625 mg tablet Take 625 mg by mouth 2 (two) times a day with meals, Historical Med      FLUoxetine (PROzac) 40 MG capsule Take 1 capsule (40 mg total) by mouth daily, Starting Fri 6/9/2023, Normal      hydrOXYzine HCL (ATARAX) 25 mg tablet Take 1 tablet (25 mg total) by mouth 3 (three) times a day as needed (for anxiety), Starting Fri 6/9/2023, Normal      levothyroxine 75 mcg tablet Take 75 mcg by mouth daily, Historical Med      lithium 300 MG tablet 2 capsule twice a day, Normal      mirtazapine (REMERON) 45 MG tablet Take 1 tablet (45 mg total) by mouth daily at bedtime, Starting Fri 6/9/2023, Normal      rOPINIRole (REQUIP) 5 MG tablet Take 5 mg by mouth daily at bedtime, Historical Med      sucralfate (CARAFATE) 1 g tablet Take 1 g by mouth 4 (four) times a day, Historical Med      traZODone (DESYREL) 100 mg tablet Take 3 tablets (300 mg total) by mouth daily at bedtime, Starting Fri 6/9/2023, Normal             No discharge procedures on file.     PDMP Review     None          ED Provider  Electronically Signed by           Rosenda Quinn DO  07/20/23 9823

## 2023-08-07 ENCOUNTER — APPOINTMENT (OUTPATIENT)
Dept: RADIOLOGY | Facility: CLINIC | Age: 63
End: 2023-08-07
Payer: COMMERCIAL

## 2023-08-07 DIAGNOSIS — M79.672 LEFT FOOT PAIN: ICD-10-CM

## 2023-08-07 PROCEDURE — 73630 X-RAY EXAM OF FOOT: CPT

## 2023-11-01 ENCOUNTER — OFFICE VISIT (OUTPATIENT)
Dept: PSYCHIATRY | Facility: CLINIC | Age: 63
End: 2023-11-01
Payer: COMMERCIAL

## 2023-11-01 DIAGNOSIS — F31.9 BIPOLAR 1 DISORDER, DEPRESSED (HCC): ICD-10-CM

## 2023-11-01 DIAGNOSIS — F41.1 GAD (GENERALIZED ANXIETY DISORDER): ICD-10-CM

## 2023-11-01 PROCEDURE — 99212 OFFICE O/P EST SF 10 MIN: CPT

## 2023-11-01 RX ORDER — FLUOXETINE HYDROCHLORIDE 40 MG/1
40 CAPSULE ORAL DAILY
Qty: 90 CAPSULE | Refills: 1 | Status: SHIPPED | OUTPATIENT
Start: 2023-11-01

## 2023-11-01 RX ORDER — MIRTAZAPINE 45 MG/1
45 TABLET, FILM COATED ORAL
Qty: 90 TABLET | Refills: 1 | Status: SHIPPED | OUTPATIENT
Start: 2023-11-01

## 2023-11-01 RX ORDER — HYDROXYZINE 50 MG/1
25 TABLET, FILM COATED ORAL 3 TIMES DAILY PRN
Qty: 270 TABLET | Refills: 1 | Status: SHIPPED | OUTPATIENT
Start: 2023-11-01

## 2023-11-01 RX ORDER — TRAZODONE HYDROCHLORIDE 100 MG/1
300 TABLET ORAL
Qty: 270 TABLET | Refills: 1 | Status: SHIPPED | OUTPATIENT
Start: 2023-11-01

## 2023-11-01 RX ORDER — LITHIUM CARBONATE 300 MG
TABLET ORAL
Qty: 360 TABLET | Refills: 1 | Status: SHIPPED | OUTPATIENT
Start: 2023-11-01

## 2023-11-01 NOTE — PSYCH
Psychiatric Medication Management - [address] [place] [address] 70 y.o. female MRN: [id number]       This note was not shared with the patient due to reasonable likelihood of causing patient harm    Reason for Visit: medication management    Subjective:   Patient is being treated for Bipolar 1 disorder and BROOKLYNN. Patient is observed on Lithium 600mg PO BID, Trazodone 300mg PO HS, Prozac 40mg PO QAM, Remeron 45mg PO HS, and Hydroxyzine 25mg PO TID PRN. Patient tolerates the medications well, has been compliant and denies any side effects. Patient reports she is "doing okay" since last appointment. Patient reports trouble sleeping past 2 months and states she gets about 2-4 hours of sleep at night. Patient reports there hasn't been any major changes in her life that may contribute to her broken sleep. Appetite has been adequate. Good energy and motivation. Patient reports her glaucoma hasn't gotten any worse and her doctor will be monitoring it closely in the next few months. Other medical issues include emphysema and hx of [place] Stephy Syndrome. Patient denies current GI symptoms. Patient denies elevated moods, paranoia, panic attacks, ah/vh and si/hi.         Review Of Systems:     Constitutional Negative   ENT Negative   Cardiovascular Hx of MI few years ago/ no current cardiac symptoms   Respiratory emphysema and COPD   Gastrointestinal Negative   Genitourinary Negative   Musculoskeletal hip pain /RLS   Integumentary Negative   Neurological Negative   Endocrine hypothyiroidism       Allergies: No Known Allergies    Past Psychiatric History: [employer] with 2 prior prescribers    Family Psychiatric History: unknown    Social History: lives with , has 2 adult children    Substance Abuse History: alcohol abuse hx, no alcohol x " several years"    Traumatic History: pt denies    The following portions of the patient's history were reviewed and updated as appropriate: allergies, past family history, past medical history, past social history, past surgical history and problem list.      Mental status:  Appearance dressed in casual clothing   Mood Euthymic   Affect Mood congruent   Speech Normal rate, rhythm, and volume   Thought Processes Linear and goal directed   Associations intact associations   Hallucinations Denies any auditory or visual hallucinations   Thought Content No passive or active suicidal or homicidal ideation, intent, or plan. Orientation Oriented to person, place, time, and situation   Recent and Remote Memory Grossly intact   Attention Span and Concentration Concentration intact   Intellect Appears to be of Average Intelligence   Insight Insight intact   Judgement judgment was intact   Muscle Strength Normal gait    Language Within normal limits   Fund of Knowledge Age appropriate   Pain moderate           Assessment/Plan:   1. Continue Trazodone 300mg PO HS  2. Continue Remeron 45mg PO HS  3. Continue Prozac 40mg PO QAM  4. Continue Lithium 600mg PO BID  5. Continue Hydroxyzine 25mg PO TID PRN  6. Call if any adverse medication side effects  7.  Follow up in 5 months    Diagnosis: Bipolar Rony type 1, BROOKLYNN    Risks, Benefits And Possible Side Effects Of Medications:  Risks, benefits, and possible side effects of medications explained to patient and family, they verbalize understanding    Controlled Medication Discussion: NA    Visit Time    Visit Start Time: 09:00 am  Visit Stop Time: 09:15 am  Total Visit Duration:  15 minutes

## 2023-12-05 ENCOUNTER — APPOINTMENT (OUTPATIENT)
Dept: RADIOLOGY | Facility: CLINIC | Age: 63
End: 2023-12-05
Payer: COMMERCIAL

## 2023-12-05 DIAGNOSIS — M54.51 VERTEBROGENIC LOW BACK PAIN: ICD-10-CM

## 2023-12-05 PROCEDURE — 72072 X-RAY EXAM THORAC SPINE 3VWS: CPT

## 2024-03-18 DIAGNOSIS — F31.9 BIPOLAR 1 DISORDER, DEPRESSED (HCC): ICD-10-CM

## 2024-03-20 RX ORDER — FLUOXETINE HYDROCHLORIDE 40 MG/1
40 CAPSULE ORAL DAILY
Qty: 90 CAPSULE | Refills: 1 | Status: SHIPPED | OUTPATIENT
Start: 2024-03-20 | End: 2024-03-26 | Stop reason: SDUPTHER

## 2024-03-20 RX ORDER — MIRTAZAPINE 45 MG/1
45 TABLET, FILM COATED ORAL
Qty: 90 TABLET | Refills: 1 | Status: SHIPPED | OUTPATIENT
Start: 2024-03-20 | End: 2024-03-26 | Stop reason: SDUPTHER

## 2024-03-20 RX ORDER — LITHIUM CARBONATE 300 MG
TABLET ORAL
Qty: 360 TABLET | Refills: 1 | Status: SHIPPED | OUTPATIENT
Start: 2024-03-20 | End: 2024-03-26 | Stop reason: SDUPTHER

## 2024-03-20 RX ORDER — TRAZODONE HYDROCHLORIDE 100 MG/1
300 TABLET ORAL
Qty: 270 TABLET | Refills: 1 | Status: SHIPPED | OUTPATIENT
Start: 2024-03-20 | End: 2024-03-26 | Stop reason: SDUPTHER

## 2024-03-26 ENCOUNTER — OFFICE VISIT (OUTPATIENT)
Dept: PSYCHIATRY | Facility: CLINIC | Age: 64
End: 2024-03-26
Payer: COMMERCIAL

## 2024-03-26 DIAGNOSIS — F41.1 GAD (GENERALIZED ANXIETY DISORDER): ICD-10-CM

## 2024-03-26 DIAGNOSIS — F31.9 BIPOLAR 1 DISORDER, DEPRESSED (HCC): ICD-10-CM

## 2024-03-26 PROCEDURE — 99212 OFFICE O/P EST SF 10 MIN: CPT

## 2024-03-26 RX ORDER — LITHIUM CARBONATE 300 MG
TABLET ORAL
Qty: 360 TABLET | Refills: 1 | Status: SHIPPED | OUTPATIENT
Start: 2024-03-26

## 2024-03-26 RX ORDER — FLUOXETINE HYDROCHLORIDE 40 MG/1
40 CAPSULE ORAL DAILY
Qty: 90 CAPSULE | Refills: 1 | Status: SHIPPED | OUTPATIENT
Start: 2024-03-26

## 2024-03-26 RX ORDER — MIRTAZAPINE 45 MG/1
45 TABLET, FILM COATED ORAL
Qty: 90 TABLET | Refills: 1 | Status: SHIPPED | OUTPATIENT
Start: 2024-03-26

## 2024-03-26 RX ORDER — TRAZODONE HYDROCHLORIDE 100 MG/1
300 TABLET ORAL
Qty: 270 TABLET | Refills: 1 | Status: SHIPPED | OUTPATIENT
Start: 2024-03-26

## 2024-03-26 RX ORDER — HYDROXYZINE 50 MG/1
25 TABLET, FILM COATED ORAL 3 TIMES DAILY PRN
Qty: 270 TABLET | Refills: 1 | Status: SHIPPED | OUTPATIENT
Start: 2024-03-26

## 2024-03-26 NOTE — PSYCH
"Psychiatric Medication Management - Behavioral Health   Anh Vega 63 y.o. female MRN: [id number]       This note was not shared with the patient due to reasonable likelihood of causing patient harm    Reason for Visit: medication management    Subjective:   Patient is being treated for Bipolar 1 disorder and BROOKLYNN. Patient is observed on Lithium 600mg PO BID, Trazodone 300mg PO HS, Prozac 40mg PO QAM, Remeron 45mg PO HS, and Hydroxyzine 25mg PO TID PRN. Patient tolerates the medications well, has been compliant and denies any side effects. Status quo. Mood stable. Sleep and appetite is adequate. Good energy and motivation. Patient reports her glaucoma hasn't gotten any worse and her doctor will be monitoring it closely in the next few months. 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 Patient denies current GI symptoms. Patient denies elevated moods, paranoia, panic attacks, ah/vh and si/hi.      Review Of Systems:     Constitutional Negative   ENT Negative   Cardiovascular Hx of MI few years ago/ no current cardiac symptoms   Respiratory emphysema and COPD   Gastrointestinal Negative   Genitourinary Negative   Musculoskeletal hip pain /RLS   Integumentary Negative   Neurological Negative   Endocrine hypothyiroidism       Allergies: No Known Allergies    Past Psychiatric History: [facility] with 2 prior prescribers    Family Psychiatric History: unknown    Social History: lives with , has 2 adult children    Substance Abuse History: alcohol abuse hx, no alcohol x \" several years\"    Traumatic History: pt denies    The following portions of the patient's history were reviewed and updated as appropriate: allergies, past family history, past medical history, past social history, past surgical history and problem list.      Mental status:  Appearance dressed in casual clothing   Mood Euthymic   Affect Mood congruent   Speech Normal rate, rhythm, and volume   Thought " Processes Linear and goal directed   Associations intact associations   Hallucinations Denies any auditory or visual hallucinations   Thought Content No passive or active suicidal or homicidal ideation, intent, or plan.   Orientation Oriented to person, place, time, and situation   Recent and Remote Memory Grossly intact   Attention Span and Concentration Concentration intact   Intellect Appears to be of Average Intelligence   Insight Insight intact   Judgement judgment was intact   Muscle Strength Normal gait    Language Within normal limits   Fund of Knowledge Age appropriate   Pain moderate           Assessment/Plan:   1. Continue Trazodone 300mg PO HS  2. Continue Remeron 45mg PO HS  3. Continue Prozac 40mg PO QAM  4. Continue Lithium 600mg PO BID  5. Continue Hydroxyzine 25mg PO TID PRN  6. Lithium levels ordered  7. Follow up in 5 months    Diagnosis: Bipolar Rony type 1, BROOKLYNN    Risks, Benefits And Possible Side Effects Of Medications:  Risks, benefits, and possible side effects of medications explained to patient and family, they verbalize understanding    Controlled Medication Discussion: NA    Visit Time    Visit Start Time: 09:00 am  Visit Stop Time: 09:15 am  Total Visit Duration:  15 minutes

## 2024-05-06 DIAGNOSIS — F31.9 BIPOLAR 1 DISORDER, DEPRESSED (HCC): ICD-10-CM

## 2024-05-07 RX ORDER — LITHIUM CARBONATE 300 MG
TABLET ORAL
Qty: 360 TABLET | Refills: 1 | Status: SHIPPED | OUTPATIENT
Start: 2024-05-07

## 2024-07-19 ENCOUNTER — TELEPHONE (OUTPATIENT)
Age: 64
End: 2024-07-19

## 2024-07-19 ENCOUNTER — TELEPHONE (OUTPATIENT)
Dept: PSYCHIATRY | Facility: CLINIC | Age: 64
End: 2024-07-19

## 2024-07-19 LAB — LITHIUM SERPL-SCNC: 1.7 MMOL/L (ref 0.5–1.2)

## 2024-07-19 NOTE — TELEPHONE ENCOUNTER
Received call from Anh with [facility] to review lab results. Lab result is for lithium ordered by MATY Aguilar. Advised Anh that this is OBGYN, she would need to contact psychiatry. She verbalized understanding.

## 2024-08-12 DIAGNOSIS — F31.9 BIPOLAR 1 DISORDER, DEPRESSED (HCC): ICD-10-CM

## 2024-08-12 RX ORDER — FLUOXETINE 40 MG/1
40 CAPSULE ORAL DAILY
Qty: 90 CAPSULE | Refills: 0 | Status: SHIPPED | OUTPATIENT
Start: 2024-08-12

## 2024-08-12 RX ORDER — TRAZODONE HYDROCHLORIDE 100 MG/1
300 TABLET ORAL
Qty: 270 TABLET | Refills: 0 | Status: SHIPPED | OUTPATIENT
Start: 2024-08-12

## 2024-08-13 RX ORDER — MIRTAZAPINE 45 MG/1
45 TABLET, FILM COATED ORAL
Qty: 90 TABLET | Refills: 1 | Status: SHIPPED | OUTPATIENT
Start: 2024-08-13

## 2024-08-19 ENCOUNTER — OFFICE VISIT (OUTPATIENT)
Dept: SURGERY | Facility: HOSPITAL | Age: 64
End: 2024-08-19
Payer: COMMERCIAL

## 2024-08-19 VITALS
TEMPERATURE: 96.7 F | HEART RATE: 72 BPM | SYSTOLIC BLOOD PRESSURE: 129 MMHG | DIASTOLIC BLOOD PRESSURE: 77 MMHG | HEIGHT: 61 IN | WEIGHT: 86.6 LBS | BODY MASS INDEX: 16.35 KG/M2

## 2024-08-19 DIAGNOSIS — R10.9 LEFT SIDED ABDOMINAL PAIN: Primary | ICD-10-CM

## 2024-08-19 PROCEDURE — 99203 OFFICE O/P NEW LOW 30 MIN: CPT | Performed by: SURGERY

## 2024-08-28 ENCOUNTER — OFFICE VISIT (OUTPATIENT)
Dept: PSYCHIATRY | Facility: CLINIC | Age: 64
End: 2024-08-28
Payer: COMMERCIAL

## 2024-08-28 ENCOUNTER — TELEPHONE (OUTPATIENT)
Age: 64
End: 2024-08-28

## 2024-08-28 DIAGNOSIS — F41.1 GAD (GENERALIZED ANXIETY DISORDER): ICD-10-CM

## 2024-08-28 DIAGNOSIS — F31.9 BIPOLAR 1 DISORDER, DEPRESSED (HCC): ICD-10-CM

## 2024-08-28 PROCEDURE — 99212 OFFICE O/P EST SF 10 MIN: CPT

## 2024-08-28 RX ORDER — TRAZODONE HYDROCHLORIDE 100 MG/1
300 TABLET ORAL
Qty: 270 TABLET | Refills: 1 | Status: SHIPPED | OUTPATIENT
Start: 2024-08-28

## 2024-08-28 RX ORDER — MIRTAZAPINE 45 MG/1
45 TABLET, FILM COATED ORAL
Qty: 90 TABLET | Refills: 1 | Status: SHIPPED | OUTPATIENT
Start: 2024-08-28

## 2024-08-28 RX ORDER — HYDROXYZINE HYDROCHLORIDE 50 MG/1
50 TABLET, FILM COATED ORAL 3 TIMES DAILY PRN
Qty: 270 TABLET | Refills: 1 | Status: SHIPPED | OUTPATIENT
Start: 2024-08-28

## 2024-08-28 RX ORDER — FLUOXETINE 40 MG/1
40 CAPSULE ORAL DAILY
Qty: 90 CAPSULE | Refills: 1 | Status: SHIPPED | OUTPATIENT
Start: 2024-08-28

## 2024-08-28 RX ORDER — OLANZAPINE 5 MG/1
5 TABLET ORAL
Qty: 90 TABLET | Refills: 1 | Status: SHIPPED | OUTPATIENT
Start: 2024-08-28

## 2024-08-28 NOTE — PSYCH
"Psychiatric Medication Management - Behavioral Health   Anh Vega 64 y.o. female MRN: [id number]       This note was not shared with the patient due to reasonable likelihood of causing patient harm    Reason for Visit: medication management    Subjective:   Patient is being treated for Bipolar 1 disorder and BROOKLYNN. Patient is observed on Lithium 600mg PO BID, Trazodone 300mg PO HS, Prozac 40mg PO QAM, Remeron 45mg PO HS, and Hydroxyzine 25mg PO TID PRN. Patient reports she stopped lithium 3-4 weeks ago and states \"I just don't want to take it anymore.\" Patient refusing medications that requires lab work. Otherwise, patient tolerates the medications well, has been compliant and denies any side effects. Patient reports doing well since last appointment with stable mood. Sleep and appetite is adequate. Good energy and motivation. Patient denies elevated moods, paranoia, panic attacks, ah/vh and si/hi. 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 Patient denies current GI symptoms.      Review Of Systems:     Constitutional Negative   ENT Negative   Cardiovascular Hx of MI few years ago/ no current cardiac symptoms   Respiratory emphysema and COPD   Gastrointestinal Negative   Genitourinary Negative   Musculoskeletal hip pain /RLS   Integumentary Negative   Neurological Negative   Endocrine hypothyiroidism       Allergies: No Known Allergies    Past Psychiatric History: [facility] with 2 prior prescribers    Family Psychiatric History: unknown    Social History: lives with , has 2 adult children    Substance Abuse History: alcohol abuse hx, no alcohol x \" several years\"    Traumatic History: pt denies    The following portions of the patient's history were reviewed and updated as appropriate: allergies, past family history, past medical history, past social history, past surgical history and problem list.      Mental status:  Appearance dressed in casual clothing   Mood " Euthymic   Affect Mood congruent   Speech Normal rate, rhythm, and volume   Thought Processes Linear and goal directed   Associations intact associations   Hallucinations Denies any auditory or visual hallucinations   Thought Content No passive or active suicidal or homicidal ideation, intent, or plan.   Orientation Oriented to person, place, time, and situation   Recent and Remote Memory Grossly intact   Attention Span and Concentration Concentration intact   Intellect Appears to be of Average Intelligence   Insight Insight intact   Judgement judgment was intact   Muscle Strength Normal gait    Language Within normal limits   Fund of Knowledge Age appropriate   Pain moderate           Assessment/Plan:   1. Continue Trazodone 300mg PO HS  2. Continue Remeron 45mg PO HS  3. Continue Prozac 40mg PO QAM  4. STOP Lithium 600mg PO BID  5. Continue Hydroxyzine 25mg PO TID PRN  6. START Zyprexa 5mg po daily  7. Follow up in 5 months    Diagnosis: Bipolar Rony type 1, BROOKLYNN    Risks, Benefits And Possible Side Effects Of Medications:  Risks, benefits, and possible side effects of medications explained to patient and family, they verbalize understanding    Controlled Medication Discussion: NA    Visit Time    Visit Start Time: 09:15 am  Visit Stop Time: 09:30 am  Total Visit Duration:  15 minutes

## 2024-09-26 NOTE — PROGRESS NOTES
Assessment/Plan:   Anh Vega is a 64 y.o.female who is here for Consult (HERNIA//LEFT SIDE OF ABDOMEN//PT mentioned the hernia pops out at two different spots and it has been doing it for a few months. One pops out in the abdominal area and the other gets stuck down a little lower close to the groin. She has constant pain while walking, she has had CT and US done. All habits have remained normal. )  .    Plan: LLQ pain - no obvious hernia, will check CTAP to evaluate for etiology of her pain    Preoperative Clearance: None    HPI:  Anh Vega is a 64 y.o.female who was referred for evaluation of Consult (HERNIA//LEFT SIDE OF ABDOMEN//PT mentioned the hernia pops out at two different spots and it has been doing it for a few months. One pops out in the abdominal area and the other gets stuck down a little lower close to the groin. She has constant pain while walking, she has had CT and US done. All habits have remained normal. )  .    Currently abd pain.     ROS:  General ROS: negative  negative for - chills, fatigue, fever or night sweats, weight loss  Respiratory ROS: no cough, shortness of breath, or wheezing  Cardiovascular ROS: no chest pain or dyspnea on exertion  Genito-Urinary ROS: no dysuria, trouble voiding, or hematuria  Musculoskeletal ROS: negative for - gait disturbance, joint pain or muscle pain  Neurological ROS: no TIA or stroke symptoms  abdominal pain    [unfilled]  Patient has no known allergies.    Current Outpatient Medications:     albuterol (ACCUNEB) 0.63 MG/3ML nebulizer solution, Take 1 ampule by nebulization 4 (four) times a day, Disp: , Rfl:     albuterol (PROVENTIL HFA,VENTOLIN HFA) 90 mcg/act inhaler, Inhale 2 puffs every 6 (six) hours as needed for wheezing, Disp: , Rfl:     budesonide-formoterol (SYMBICORT) 160-4.5 mcg/act inhaler, Inhale 2 puffs 2 (two) times a day, Disp: , Rfl:     colesevelam (WELCHOL) 625 mg tablet, Take 625 mg by mouth 2 (two) times a day with meals, Disp: ,  Rfl:     diphenhydrAMINE (BENADRYL) 50 mg capsule, Take 1 capsule (50 mg total) by mouth every 6 (six) hours as needed for itching, Disp: 30 capsule, Rfl: 0    levothyroxine 75 mcg tablet, Take 75 mcg by mouth daily, Disp: , Rfl:     predniSONE 10 mg tablet, 4 pills x 2 days, 3 pills x 2 days, 2 pills x 2 days, 1 pill x 2 days. Do not start before July 21, 2023. (Patient taking differently: 4 pills x 2 days, 3 pills x 2 days, 2 pills x 2 days, 1 pill x 2 days., as needed), Disp: 20 tablet, Rfl: 0    rOPINIRole (REQUIP) 5 MG tablet, Take 5 mg by mouth daily at bedtime, Disp: , Rfl:     sucralfate (CARAFATE) 1 g tablet, Take 1 g by mouth 4 (four) times a day, Disp: , Rfl:     FLUoxetine (PROzac) 40 MG capsule, Take 1 capsule (40 mg total) by mouth daily, Disp: 90 capsule, Rfl: 1    hydrOXYzine HCL (ATARAX) 50 mg tablet, Take 1 tablet (50 mg total) by mouth 3 (three) times a day as needed (for anxiety), Disp: 270 tablet, Rfl: 1    mirtazapine (REMERON) 45 MG tablet, Take 1 tablet (45 mg total) by mouth daily at bedtime, Disp: 90 tablet, Rfl: 1    OLANZapine (ZyPREXA) 5 mg tablet, Take 1 tablet (5 mg total) by mouth daily at bedtime, Disp: 90 tablet, Rfl: 1    traZODone (DESYREL) 100 mg tablet, Take 3 tablets (300 mg total) by mouth daily at bedtime, Disp: 270 tablet, Rfl: 1  Past Medical History:   Diagnosis Date    COPD (chronic obstructive pulmonary disease) ([facility])     Depression     Disease of thyroid gland     GERD (gastroesophageal reflux disease)     Glaucoma     Psychiatric disorder     bipolar    Restless leg syndrome     Stroke (HCC)     3 years; affected eye sight     Past Surgical History:   Procedure Laterality Date    APPENDECTOMY  1977    BREAST EXCISIONAL BIOPSY Left 1997    cyst removed    FISTULA REPAIR      HIP SURGERY      11/2016: PINS IN. 8/2017: cOMPLETE HIP REPLACEMENT 1/2018: COMPLETE HIP REPLACEMENT    HYSTERECTOMY  2000    JOINT REPLACEMENT      OTHER SURGICAL HISTORY  1998    bladder lift     OTHER SURGICAL HISTORY      UISTULA    OTHER SURGICAL HISTORY  1989    ULCERS     Family History   Problem Relation Age of Onset    No Known Problems Mother     Prostate cancer Father         guessing 65    No Known Problems Maternal Grandmother     No Known Problems Maternal Grandfather     No Known Problems Paternal Grandmother     No Known Problems Paternal Grandfather     No Known Problems Maternal Aunt     No Known Problems Maternal Aunt     No Known Problems Paternal Aunt       reports that she has quit smoking. She has never used smokeless tobacco. She reports that she does not drink alcohol and does not use drugs.    Labs:   Lab Results   Component Value Date    WBC 8.56 12/20/2016    WBC 11.84 (H) 12/19/2016    WBC 7.74 09/27/2016    WBC 7.39 11/25/2015    HGB 11.5 12/20/2016    HGB 11.7 12/19/2016    HGB 14.0 09/27/2016    HGB 15.3 11/25/2015     (H) 12/20/2016     (H) 12/19/2016     (H) 09/27/2016     11/25/2015     Lab Results   Component Value Date    ALT 23 12/20/2016    ALT 27 12/19/2016    ALT 47 09/27/2016    ALT 36 11/25/2015    AST 12 12/20/2016    AST 11 12/19/2016    AST 21 09/27/2016    AST 12 11/25/2015     This SmartLink has not been configured with any valid records.       PHYSICAL EXAM  General Appearance:    Alert, cooperative, no distress,    Head:    Normocephalic without obvious abnormality   Eyes:    PERRL, conjunctiva/corneas clear, EOM's intact        Neck:   Supple, no adenopathy, no JVD   Back:     Symmetric, no spinal or CVA tenderness   Lungs:     Clear to auscultation bilaterally, no wheezing or rhonchi   Heart:    Regular rate and rhythm, S1 and S2 normal, no murmur   Abdomen:     Soft +BS NDNT   Extremities:   Extremities normal. No clubbing, cyanosis or edema   Psych:   Normal Affect, AOx3.    Neurologic:  Skin:   CNII-XII intact. Strength symmetric, speech intact    Warm, dry, intact, no visible rashes or lesions         Physical  "Exam              Some portions of this record may have been generated with voice recognition software. There may be translation, syntax,  or grammatical errors. Occasional wrong word or \"sound-a-like\" substitutions may have occurred due to the inherent limitations of the voice recognition software. Read the chart carefully and recognize, using context, where substitutions may have occurred. If you have any questions, please contact the dictating provider for clarification or correction, as needed. This encounter has been coded by a non-certified coder.   "

## 2024-10-30 ENCOUNTER — HOSPITAL ENCOUNTER (OUTPATIENT)
Dept: MAMMOGRAPHY | Facility: IMAGING CENTER | Age: 64
Discharge: HOME | End: 2024-10-30
Payer: COMMERCIAL

## 2024-10-30 VITALS — BODY MASS INDEX: 15.86 KG/M2 | HEIGHT: 61 IN | WEIGHT: 84 LBS

## 2024-10-30 DIAGNOSIS — Z12.31 ENCOUNTER FOR SCREENING MAMMOGRAM FOR MALIGNANT NEOPLASM OF BREAST: ICD-10-CM

## 2024-10-30 PROCEDURE — 77063 BREAST TOMOSYNTHESIS BI: CPT

## 2024-10-30 PROCEDURE — 77067 SCR MAMMO BI INCL CAD: CPT

## 2025-01-15 ENCOUNTER — TELEPHONE (OUTPATIENT)
Dept: PSYCHIATRY | Facility: CLINIC | Age: 65
End: 2025-01-15

## 2025-01-15 NOTE — TELEPHONE ENCOUNTER
Writer left voicemail for client to call back with insurance information for her appointment 1/29/25 (RTE returning Medicare A and B, but ID number is required).

## 2025-01-15 NOTE — TELEPHONE ENCOUNTER
Writer left another voicemail to disregard last one, writer was able to find the insurance information via [employer] instead of [employer].

## 2025-01-22 DIAGNOSIS — F31.9 BIPOLAR 1 DISORDER, DEPRESSED (HCC): ICD-10-CM

## 2025-01-22 RX ORDER — MIRTAZAPINE 45 MG/1
45 TABLET, FILM COATED ORAL
Qty: 90 TABLET | Refills: 0 | Status: SHIPPED | OUTPATIENT
Start: 2025-01-22

## 2025-01-22 RX ORDER — OLANZAPINE 5 MG/1
5 TABLET ORAL
Qty: 90 TABLET | Refills: 3 | Status: SHIPPED | OUTPATIENT
Start: 2025-01-22

## 2025-01-29 ENCOUNTER — OFFICE VISIT (OUTPATIENT)
Dept: PSYCHIATRY | Facility: CLINIC | Age: 65
End: 2025-01-29
Payer: COMMERCIAL

## 2025-01-29 DIAGNOSIS — F41.1 GAD (GENERALIZED ANXIETY DISORDER): ICD-10-CM

## 2025-01-29 DIAGNOSIS — F31.9 BIPOLAR 1 DISORDER, DEPRESSED (HCC): ICD-10-CM

## 2025-01-29 PROCEDURE — 99214 OFFICE O/P EST MOD 30 MIN: CPT

## 2025-01-29 RX ORDER — HYDROXYZINE HYDROCHLORIDE 50 MG/1
50 TABLET, FILM COATED ORAL 3 TIMES DAILY PRN
Qty: 270 TABLET | Refills: 1 | Status: SHIPPED | OUTPATIENT
Start: 2025-01-29

## 2025-01-29 RX ORDER — MIRTAZAPINE 45 MG/1
45 TABLET, FILM COATED ORAL
Qty: 90 TABLET | Refills: 1 | Status: SHIPPED | OUTPATIENT
Start: 2025-01-29

## 2025-01-29 RX ORDER — FLUOXETINE 40 MG/1
40 CAPSULE ORAL DAILY
Qty: 90 CAPSULE | Refills: 1 | Status: SHIPPED | OUTPATIENT
Start: 2025-01-29

## 2025-01-29 RX ORDER — TRAZODONE HYDROCHLORIDE 100 MG/1
300 TABLET ORAL
Qty: 270 TABLET | Refills: 1 | Status: SHIPPED | OUTPATIENT
Start: 2025-01-29

## 2025-01-29 NOTE — PSYCH
"Psychiatric Medication Management - Behavioral Health   Anh Vega 64 y.o. female MRN: [id number]       This note was not shared with the patient due to reasonable likelihood of causing patient harm     Assessment/Plan:   1. Continue Trazodone 300mg PO HS  2. Continue Remeron 45mg PO HS  3. Continue Prozac 40mg PO QAM  4. Continue Zyprexa 5mg po HS  5. Continue Hydroxyzine 25mg PO TID PRN  6. Follow up in 6 months    Diagnosis: Bipolar Rony type 1, BROOKLYNN    Reason for Visit: medication management    Subjective:   Patient is being treated for Bipolar 1 disorder and BROOKLYNN. Patient is observed on Trazodone 300mg PO HS, Prozac 40mg PO QAM, Remeron 45mg PO HS, and Hydroxyzine 25mg PO TID PRN. She recently stopped lithium and was started on zyprexa 5mg po hs. She tolerates the medications, has been compliant and denies any side effects. Patient reports she has been doing well mentally since last appointment. Patient rates both depression and anxiety 5/10. Denies toni/elevated moods and panic attacks. Sleep has improved. Appetite is \"pretty good.\" Good energy and motivation. She denies si/hi. 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 Patient denies current GI symptoms.      Review Of Systems:     Constitutional Negative   ENT Negative   Cardiovascular Hx of MI few years ago/ no current cardiac symptoms   Respiratory emphysema and COPD   Gastrointestinal Negative   Genitourinary Negative   Musculoskeletal hip pain /RLS   Integumentary Negative   Neurological Negative   Endocrine hypothyiroidism       Allergies: No Known Allergies    Past Psychiatric History: [facility] with 2 prior prescribers    Family Psychiatric History: unknown    Social History: lives with , has 2 adult children    Substance Abuse History: alcohol abuse hx, no alcohol x \" several years\"    Traumatic History: pt denies    The following portions of the patient's history were reviewed and updated as " appropriate: allergies, past family history, past medical history, past social history, past surgical history and problem list.      Mental status:  Appearance dressed in casual clothing   Mood Euthymic   Affect Mood congruent   Speech Normal rate, rhythm, and volume   Thought Processes Linear and goal directed   Associations intact associations   Hallucinations Denies any auditory or visual hallucinations   Thought Content No passive or active suicidal or homicidal ideation, intent, or plan.   Orientation Oriented to person, place, time, and situation   Recent and Remote Memory Grossly intact   Attention Span and Concentration Concentration intact   Intellect Appears to be of Average Intelligence   Insight Insight intact   Judgement judgment was intact   Muscle Strength Normal gait    Language Within normal limits   Fund of Knowledge Age appropriate   Pain moderate              Risks, Benefits And Possible Side Effects Of Medications:  Risks, benefits, and possible side effects of medications explained to patient and family, they verbalize understanding    Controlled Medication Discussion: NA    Visit Time    Visit Start Time: 09:00 am  Visit Stop Time: 09:14 am  Total Visit Duration:  14 minutes

## 2025-06-15 DIAGNOSIS — F31.9 BIPOLAR 1 DISORDER, DEPRESSED (HCC): ICD-10-CM

## 2025-06-16 RX ORDER — TRAZODONE HYDROCHLORIDE 100 MG/1
300 TABLET ORAL
Qty: 270 TABLET | Refills: 0 | Status: SHIPPED | OUTPATIENT
Start: 2025-06-16

## 2025-06-16 RX ORDER — FLUOXETINE HYDROCHLORIDE 40 MG/1
40 CAPSULE ORAL DAILY
Qty: 90 CAPSULE | Refills: 0 | Status: SHIPPED | OUTPATIENT
Start: 2025-06-16

## 2025-07-30 ENCOUNTER — OFFICE VISIT (OUTPATIENT)
Dept: PSYCHIATRY | Facility: CLINIC | Age: 65
End: 2025-07-30
Payer: COMMERCIAL

## 2025-07-30 DIAGNOSIS — F41.1 GAD (GENERALIZED ANXIETY DISORDER): ICD-10-CM

## 2025-07-30 DIAGNOSIS — F31.9 BIPOLAR 1 DISORDER, DEPRESSED (HCC): ICD-10-CM

## 2025-07-30 PROCEDURE — 99214 OFFICE O/P EST MOD 30 MIN: CPT

## 2025-07-30 RX ORDER — HYDROXYZINE HYDROCHLORIDE 50 MG/1
50 TABLET, FILM COATED ORAL 3 TIMES DAILY PRN
Qty: 270 TABLET | Refills: 1 | Status: SHIPPED | OUTPATIENT
Start: 2025-07-30

## 2025-07-30 RX ORDER — OLANZAPINE 5 MG/1
5 TABLET, FILM COATED ORAL
Qty: 90 TABLET | Refills: 1 | Status: SHIPPED | OUTPATIENT
Start: 2025-07-30

## 2025-07-30 RX ORDER — TRAZODONE HYDROCHLORIDE 300 MG/1
300 TABLET ORAL
Qty: 90 TABLET | Refills: 1 | Status: SHIPPED | OUTPATIENT
Start: 2025-07-30

## 2025-07-30 RX ORDER — FLUOXETINE HYDROCHLORIDE 40 MG/1
40 CAPSULE ORAL DAILY
Qty: 90 CAPSULE | Refills: 1 | Status: SHIPPED | OUTPATIENT
Start: 2025-07-30

## 2025-07-30 RX ORDER — MIRTAZAPINE 45 MG/1
45 TABLET, FILM COATED ORAL
Qty: 90 TABLET | Refills: 1 | Status: SHIPPED | OUTPATIENT
Start: 2025-07-30

## 2025-08-04 ENCOUNTER — TELEPHONE (OUTPATIENT)
Age: 65
End: 2025-08-04